# Patient Record
Sex: FEMALE | Race: WHITE | NOT HISPANIC OR LATINO | Employment: FULL TIME | URBAN - METROPOLITAN AREA
[De-identification: names, ages, dates, MRNs, and addresses within clinical notes are randomized per-mention and may not be internally consistent; named-entity substitution may affect disease eponyms.]

---

## 2018-12-04 ASSESSMENT — ANXIETY QUESTIONNAIRES
GAD7 TOTAL SCORE: 0
GAD7 TOTAL SCORE: 0
1. FEELING NERVOUS, ANXIOUS, OR ON EDGE: NOT AT ALL
6. BECOMING EASILY ANNOYED OR IRRITABLE: NOT AT ALL
7. FEELING AFRAID AS IF SOMETHING AWFUL MIGHT HAPPEN: NOT AT ALL
2. NOT BEING ABLE TO STOP OR CONTROL WORRYING: NOT AT ALL
4. TROUBLE RELAXING: NOT AT ALL
3. WORRYING TOO MUCH ABOUT DIFFERENT THINGS: NOT AT ALL
7. FEELING AFRAID AS IF SOMETHING AWFUL MIGHT HAPPEN: NOT AT ALL
5. BEING SO RESTLESS THAT IT IS HARD TO SIT STILL: NOT AT ALL

## 2018-12-05 ENCOUNTER — OFFICE VISIT (OUTPATIENT)
Dept: OBGYN | Facility: CLINIC | Age: 28
End: 2018-12-05
Attending: OBSTETRICS & GYNECOLOGY
Payer: COMMERCIAL

## 2018-12-05 VITALS
DIASTOLIC BLOOD PRESSURE: 72 MMHG | HEART RATE: 58 BPM | BODY MASS INDEX: 26.13 KG/M2 | WEIGHT: 147.5 LBS | SYSTOLIC BLOOD PRESSURE: 106 MMHG | HEIGHT: 63 IN

## 2018-12-05 DIAGNOSIS — Z12.4 SCREENING FOR MALIGNANT NEOPLASM OF CERVIX: Primary | ICD-10-CM

## 2018-12-05 PROCEDURE — G0463 HOSPITAL OUTPT CLINIC VISIT: HCPCS | Mod: ZF

## 2018-12-05 PROCEDURE — G0145 SCR C/V CYTO,THINLAYER,RESCR: HCPCS | Performed by: OBSTETRICS & GYNECOLOGY

## 2018-12-05 ASSESSMENT — PATIENT HEALTH QUESTIONNAIRE - PHQ9
5. POOR APPETITE OR OVEREATING: NOT AT ALL
SUM OF ALL RESPONSES TO PHQ QUESTIONS 1-9: 0

## 2018-12-05 ASSESSMENT — ANXIETY QUESTIONNAIRES
7. FEELING AFRAID AS IF SOMETHING AWFUL MIGHT HAPPEN: NOT AT ALL
1. FEELING NERVOUS, ANXIOUS, OR ON EDGE: NOT AT ALL
GAD7 TOTAL SCORE: 0
3. WORRYING TOO MUCH ABOUT DIFFERENT THINGS: NOT AT ALL
GAD7 TOTAL SCORE: 0
5. BEING SO RESTLESS THAT IT IS HARD TO SIT STILL: NOT AT ALL
6. BECOMING EASILY ANNOYED OR IRRITABLE: NOT AT ALL
2. NOT BEING ABLE TO STOP OR CONTROL WORRYING: NOT AT ALL

## 2018-12-05 NOTE — PROGRESS NOTES
CC/HPI:   Kayla Swanson is a 28 year old female  who presents today for her first annual exam. She is currently using abstinence  and would like to continue with this method of birth control until she gets  in June, 2019. Overall, patient reports doing well, little stressed with wedding plans. She says she's extremely nervous for her first pap smear. She has a friend with her.  Nervous for first IC as well.      Denies trauma or h/o abuse.     HISTORIES:  Patient Active Problem List   Diagnosis     Acne     Past Medical History:   Diagnosis Date     Uncomplicated asthma 2000    As a kid. Haven't required inhaler for years.     Past Surgical History:   Procedure Laterality Date     NO HISTORY OF SURGERY       Current Outpatient Prescriptions   Medication Sig Dispense Refill     Dietary Management Product (AXONA PO) Take by mouth daily       fluticasone (FLONASE) 50 MCG/ACT nasal spray Spray 2 sprays into both nostrils daily (Patient not taking: Reported on 12/5/2018) 1 Package 0     Tretinoin (RETIN-A EX) Externally apply topically as needed       No Known Allergies  Social History     Social History     Marital status: Single     Spouse name: N/A     Number of children: N/A     Years of education: N/A     Occupational History     Not on file.     Social History Main Topics     Smoking status: Never Smoker     Smokeless tobacco: Never Used     Alcohol use Yes      Comment: periodically drink wine for dinner.     Drug use: No     Sexual activity: No     Other Topics Concern     Not on file     Social History Narrative     Family History   Problem Relation Age of Onset     Diabetes Father      Type 2        Gyn Hx:   Patient's last menstrual period was 11/09/2018 (exact date).  Menses:   age of menarche:  13 years old  interval:  4 weeks  duration:  4 day(s)    Review Of Systems:  CONSTITUTIONAL: NEGATIVE for fever, chills  EYES: NEGATIVE for vision changes   RESP: NEGATIVE for significant cough or SOB  CV:  "NEGATIVE for chest pain, palpitations   GI: NEGATIVE for nausea, abdominal pain, heartburn, or change in bowel habits  : NEGATIVE for frequency, dysuria, or hematuria  MUSCULOSKELETAL: NEGATIVE for significant arthralgias or myalgia  NEURO: NEGATIVE for weakness, dizziness or paresthesias or headache    EXAM:  /72 (BP Location: Left arm, Patient Position: Chair)  Pulse 58  Ht 1.588 m (5' 2.5\")  Wt 66.9 kg (147 lb 8 oz)  LMP 11/09/2018 (Exact Date)  Breastfeeding? No  BMI 26.55 kg/m2  Body mass index is 26.55 kg/(m^2).    General - pleasant female in no acute distress, intermittently tearful during the exam.  Skin - no suspicious lesions or rashes  Lungs - clear to auscultation bilaterally.  Heart - regular rate and rhythm without murmur.  Breasts- symmetrical . No dominant fixed or suspicious masses noted.  No skin or nipple changes or axillary nodes.   Abdomen - soft, nontender, nondistended, no masses or organomegaly noted.  Musculoskeletal - no gross deformities.  Neurological - appropriate mental status.  Pelvic - EG: normal  female, vulva reveals no erythema or lesions.   BUS: within normal limits.  Vagina: well rugated, no lesions polyps or suspicious  discharge.     Cervix: no lesions, polyps discharge or CMT. Pap collected  Uterus: firm, anteverted, normal size and nontender.  Adnexa: no masses or tenderness.  Anus- normal, no lesions.  Rectovaginal - deferred.    Very tight pelvic floor muscles.       ASSESSMENT/PLAN  (Z12.4) Screening for malignant neoplasm of cervix  (primary encounter diagnosis)  Comment: Initial pap smear completed today, results pending.   Plan: recommended age 25 - 29 years screening if results are negative        Additional teaching done at this visit regarding exercise and birth control. I have discussed with patient risks and benefits of different forms of birth control.     Offered pelvic PT referral to see if biofeedback is possible prior to initiating IC.      The " patient will be notified of any results via [employer].      Tigist Andre, MS3 scribing for Dr. Mala Edgar MD     I was present with the medical student who participated in the service and in the documentation of the note. I have verified the history and personally performed the physical exam and medical decision making. I agree with the assessment and plan of care as documented in the note.    Mala Edgar MD        Answers for HPI/ROS submitted by the patient on 12/4/2018   CHIQUITA 7 TOTAL SCORE: 0  General Symptoms: No  Skin Symptoms: No  HENT Symptoms: No  EYE SYMPTOMS: No  HEART SYMPTOMS: No  LUNG SYMPTOMS: No  INTESTINAL SYMPTOMS: No  URINARY SYMPTOMS: No  GYNECOLOGIC SYMPTOMS: No  BREAST SYMPTOMS: No  SKELETAL SYMPTOMS: No  BLOOD SYMPTOMS: No  NERVOUS SYSTEM SYMPTOMS: No  MENTAL HEALTH SYMPTOMS: No

## 2018-12-05 NOTE — LETTER
12/5/2018       RE: Kayla Swanson  [address]  [facility] [address]     Dear Colleague,    Thank you for referring your patient, Kayla Swanson, to the [facility] at [facility]. Please see a copy of my visit note below.    CC/HPI:   Kayla Swanson is a 28 year old female  who presents today for her first annual exam. She is currently using abstinence  and would like to continue with this method of birth control until she gets  in June, 2019. Overall, patient reports doing well, little stressed with wedding plans. She says she's extremely nervous for her first pap smear. She has a friend with her.  Nervous for first IC as well.      Denies trauma or h/o abuse.     HISTORIES:  Patient Active Problem List   Diagnosis     Acne     Past Medical History:   Diagnosis Date     Uncomplicated asthma 2000    As a kid. Haven't required inhaler for years.     Past Surgical History:   Procedure Laterality Date     NO HISTORY OF SURGERY       Current Outpatient Prescriptions   Medication Sig Dispense Refill     Dietary Management Product (AXONA PO) Take by mouth daily       fluticasone (FLONASE) 50 MCG/ACT nasal spray Spray 2 sprays into both nostrils daily (Patient not taking: Reported on 12/5/2018) 1 Package 0     Tretinoin (RETIN-A EX) Externally apply topically as needed       No Known Allergies  Social History     Social History     Marital status: Single     Spouse name: N/A     Number of children: N/A     Years of education: N/A     Occupational History     Not on file.     Social History Main Topics     Smoking status: Never Smoker     Smokeless tobacco: Never Used     Alcohol use Yes      Comment: periodically drink wine for dinner.     Drug use: No     Sexual activity: No     Other Topics Concern     Not on file     Social History Narrative     Family History   Problem Relation Age of Onset     Diabetes Father      Type 2        Gyn Hx:  "  Patient's last menstrual period was 11/09/2018 (exact date).  Menses:   age of menarche:  13 years old  interval:  4 weeks  duration:  4 day(s)    Review Of Systems:  CONSTITUTIONAL: NEGATIVE for fever, chills  EYES: NEGATIVE for vision changes   RESP: NEGATIVE for significant cough or SOB  CV: NEGATIVE for chest pain, palpitations   GI: NEGATIVE for nausea, abdominal pain, heartburn, or change in bowel habits  : NEGATIVE for frequency, dysuria, or hematuria  MUSCULOSKELETAL: NEGATIVE for significant arthralgias or myalgia  NEURO: NEGATIVE for weakness, dizziness or paresthesias or headache    EXAM:  /72 (BP Location: Left arm, Patient Position: Chair)  Pulse 58  Ht 1.588 m (5' 2.5\")  Wt 66.9 kg (147 lb 8 oz)  LMP 11/09/2018 (Exact Date)  Breastfeeding? No  BMI 26.55 kg/m2  Body mass index is 26.55 kg/(m^2).    General - pleasant female in no acute distress, intermittently tearful during the exam.  Skin - no suspicious lesions or rashes  Lungs - clear to auscultation bilaterally.  Heart - regular rate and rhythm without murmur.  Breasts- symmetrical . No dominant fixed or suspicious masses noted.  No skin or nipple changes or axillary nodes.   Abdomen - soft, nontender, nondistended, no masses or organomegaly noted.  Musculoskeletal - no gross deformities.  Neurological - appropriate mental status.  Pelvic - EG: normal  female, vulva reveals no erythema or lesions.   BUS: within normal limits.  Vagina: well rugated, no lesions polyps or suspicious  discharge.     Cervix: no lesions, polyps discharge or CMT. Pap collected  Uterus: firm, anteverted, normal size and nontender.  Adnexa: no masses or tenderness.  Anus- normal, no lesions.  Rectovaginal - deferred.    Very tight pelvic floor muscles.       ASSESSMENT/PLAN  (Z12.4) Screening for malignant neoplasm of cervix  (primary encounter diagnosis)  Comment: Initial pap smear completed today, results pending.   Plan: recommended age 25 - 29 years " screening if results are negative        Additional teaching done at this visit regarding exercise and birth control. I have discussed with patient risks and benefits of different forms of birth control.     Offered pelvic PT referral to see if biofeedback is possible prior to initiating IC.      The patient will be notified of any results via [employer].      Tigist Andre, MS3 scribing for Dr. Mala Edgar MD     I was present with the medical student who participated in the service and in the documentation of the note. I have verified the history and personally performed the physical exam and medical decision making. I agree with the assessment and plan of care as documented in the note.    Mala Edgar MD        Answers for HPI/ROS submitted by the patient on 12/4/2018   CHIQUITA 7 TOTAL SCORE: 0  General Symptoms: No  Skin Symptoms: No  HENT Symptoms: No  EYE SYMPTOMS: No  HEART SYMPTOMS: No  LUNG SYMPTOMS: No  INTESTINAL SYMPTOMS: No  URINARY SYMPTOMS: No  GYNECOLOGIC SYMPTOMS: No  BREAST SYMPTOMS: No  SKELETAL SYMPTOMS: No  BLOOD SYMPTOMS: No  NERVOUS SYSTEM SYMPTOMS: No  MENTAL HEALTH SYMPTOMS: No      Again, thank you for allowing me to participate in the care of your patient.      Sincerely,    Mala Edgar MD

## 2018-12-05 NOTE — LETTER
Date:December 6, 2018      Patient was self referred, no letter generated. Do not send.        [facility] Information

## 2018-12-05 NOTE — MR AVS SNAPSHOT
"              After Visit Summary   12/5/2018    Kayla Swanson    MRN: [id number]           Patient Information     Date Of Birth          1990        Visit Information        Provider Department      12/5/2018 8:45 AM Mala Edgar MD [facility]        Today's Diagnoses     Screening for malignant neoplasm of cervix    -  1       Follow-ups after your visit        Who to contact     Please call your clinic at [phone] to:    Ask questions about your health    Make or cancel appointments    Discuss your medicines    Learn about your test results    Speak to your doctor            Additional Information About Your Visit        MyChart Information     [employer] gives you secure access to your electronic health record. If you see a primary care provider, you can also send messages to your care team and make appointments. If you have questions, please call your primary care clinic.  If you do not have a primary care provider, please call [phone] and they will assist you.      T[employer] is an electronic gateway that provides easy, online access to your medical records. With [employer], you can request a clinic appointment, read your test results, renew a prescription or communicate with your care team.     To access your existing account, please contact your [facility] or call [phone] for assistance.        Care EveryWhere ID     This is your Care EveryWhere ID. This could be used by other organizations to access your [facility] medical records  [id number]        Your Vitals Were     Pulse Height Last Period Breastfeeding? BMI (Body Mass Index)       58 1.588 m (5' 2.5\") 11/09/2018 (Exact Date) No 26.55 kg/m2        Blood Pressure from Last 3 Encounters:   12/05/18 106/72   04/03/14 112/78    Weight from Last 3 Encounters:   12/05/18 66.9 kg (147 lb 8 oz)   04/03/14 61.2 kg (135 lb)              We Performed the Following     Obtaining, " preparing and conveyance of cervical or vaginal smear to laboratory.     Pap imaged thin layer screen reflex to HPV if ASCUS - recommended age 25 - 29 years        Primary Care Provider    None Specified       No primary provider on file.        Equal Access to Services     SIGIFREDO OLGUIN : Lily Zaragoza, mikpankaj mckenzieaudeliaha, santos childress paulcalderonpankaj, marek kendrick haymanoj marrufojohnraphael lees. So [facility] [phone].    ATENCIÓN: Si habla español, tiene a sharma disposición servicios gratuitos de asistencia lingüística. Llame al [phone].    We comply with applicable federal civil rights laws and [place] laws. We do not discriminate on the basis of race, color, national origin, age, disability, sex, sexual orientation, or gender identity.            Thank you!     Thank you for choosing [facility]  for your care. Our goal is always to provide you with excellent care. Hearing back from our patients is one way we can continue to improve our services. Please take a few minutes to complete the written survey that you may receive in the mail after your visit with us. Thank you!             Your Updated Medication List - Protect others around you: Learn how to safely use, store and throw away your medicines at www.disposemymeds.org.          This list is accurate as of 12/5/18  2:11 PM.  Always use your most recent med list.                   Brand Name Dispense Instructions for use Diagnosis    AXONA PO      Take by mouth daily        fluticasone 50 MCG/ACT nasal spray    FLONASE    1 Package    Spray 2 sprays into both nostrils daily    Serous otitis media, bilateral       RETIN-A EX      Externally apply topically as needed

## 2018-12-10 LAB
COPATH REPORT: NORMAL
PAP: NORMAL

## 2018-12-14 ENCOUNTER — MYC MEDICAL ADVICE (OUTPATIENT)
Dept: OBGYN | Facility: CLINIC | Age: 28
End: 2018-12-14

## 2018-12-24 DIAGNOSIS — Z30.011 INITIATION OF ORAL CONTRACEPTION: Primary | ICD-10-CM

## 2018-12-24 RX ORDER — LEVONORGESTREL/ETHIN.ESTRADIOL 0.1-0.02MG
1 TABLET ORAL DAILY
Qty: 84 TABLET | Refills: 0 | Status: SHIPPED | OUTPATIENT
Start: 2018-12-24 | End: 2019-03-02

## 2019-01-02 NOTE — TELEPHONE ENCOUNTER
Looks like Dr. Castellon sent in the rx.    Mala Edgar MD, [facility]  [facility]  OB/GYN    1/2/2019  1:01 PM

## 2019-03-02 ENCOUNTER — MYC REFILL (OUTPATIENT)
Dept: OBGYN | Facility: CLINIC | Age: 29
End: 2019-03-02

## 2019-03-02 DIAGNOSIS — Z30.011 INITIATION OF ORAL CONTRACEPTION: ICD-10-CM

## 2019-03-08 RX ORDER — LEVONORGESTREL/ETHIN.ESTRADIOL 0.1-0.02MG
1 TABLET ORAL DAILY
Qty: 84 TABLET | Refills: 3 | Status: SHIPPED | OUTPATIENT
Start: 2019-03-08 | End: 2019-08-20

## 2019-03-08 NOTE — TELEPHONE ENCOUNTER
Received refill request for OCP.  Last in clinic 12/2018 with last pap done at that time and NIL. One year refill sent.

## 2019-08-20 ENCOUNTER — MYC REFILL (OUTPATIENT)
Dept: OBGYN | Facility: CLINIC | Age: 29
End: 2019-08-20

## 2019-08-20 DIAGNOSIS — Z30.011 INITIATION OF ORAL CONTRACEPTION: ICD-10-CM

## 2019-08-23 RX ORDER — LEVONORGESTREL/ETHIN.ESTRADIOL 0.1-0.02MG
1 TABLET ORAL DAILY
Qty: 84 TABLET | Refills: 3 | Status: SHIPPED | OUTPATIENT
Start: 2019-08-23 | End: 2020-07-22

## 2019-08-23 NOTE — TELEPHONE ENCOUNTER
Received refill request for OCP.  Last in clinic December 2018 with Pap NIL.  Able to send 1-year refill per protocol.

## 2019-11-16 ENCOUNTER — MYC REFILL (OUTPATIENT)
Dept: OBGYN | Facility: CLINIC | Age: 29
End: 2019-11-16

## 2019-11-16 DIAGNOSIS — Z30.011 INITIATION OF ORAL CONTRACEPTION: ICD-10-CM

## 2019-11-16 RX ORDER — LEVONORGESTREL/ETHIN.ESTRADIOL 0.1-0.02MG
1 TABLET ORAL DAILY
Qty: 84 TABLET | Refills: 3 | Status: CANCELLED | OUTPATIENT
Start: 2019-11-16

## 2020-03-01 ENCOUNTER — HEALTH MAINTENANCE LETTER (OUTPATIENT)
Age: 30
End: 2020-03-01

## 2020-07-22 ENCOUNTER — MYC REFILL (OUTPATIENT)
Dept: OBGYN | Facility: CLINIC | Age: 30
End: 2020-07-22

## 2020-07-22 DIAGNOSIS — Z30.011 INITIATION OF ORAL CONTRACEPTION: ICD-10-CM

## 2020-07-24 RX ORDER — LEVONORGESTREL/ETHIN.ESTRADIOL 0.1-0.02MG
1 TABLET ORAL DAILY
Qty: 84 TABLET | Refills: 3 | Status: SHIPPED | OUTPATIENT
Start: 2020-07-24 | End: 2020-10-30

## 2020-07-24 NOTE — TELEPHONE ENCOUNTER
OCP refill request. Pt last seen in clinic Dec 2018. Pap UTD. Will send refill and encourage pt to schedule annual

## 2020-08-24 ASSESSMENT — ANXIETY QUESTIONNAIRES
2. NOT BEING ABLE TO STOP OR CONTROL WORRYING: NOT AT ALL
1. FEELING NERVOUS, ANXIOUS, OR ON EDGE: NOT AT ALL
3. WORRYING TOO MUCH ABOUT DIFFERENT THINGS: NOT AT ALL
5. BEING SO RESTLESS THAT IT IS HARD TO SIT STILL: NOT AT ALL
GAD7 TOTAL SCORE: 0
4. TROUBLE RELAXING: NOT AT ALL
7. FEELING AFRAID AS IF SOMETHING AWFUL MIGHT HAPPEN: NOT AT ALL
6. BECOMING EASILY ANNOYED OR IRRITABLE: NOT AT ALL

## 2020-08-25 ENCOUNTER — OFFICE VISIT (OUTPATIENT)
Dept: OBGYN | Facility: CLINIC | Age: 30
End: 2020-08-25
Payer: COMMERCIAL

## 2020-08-25 DIAGNOSIS — Z01.419 ENCOUNTER FOR GYNECOLOGICAL EXAMINATION WITHOUT ABNORMAL FINDING: Primary | ICD-10-CM

## 2020-08-25 ASSESSMENT — ANXIETY QUESTIONNAIRES
7. FEELING AFRAID AS IF SOMETHING AWFUL MIGHT HAPPEN: NOT AT ALL
GAD7 TOTAL SCORE: 0
GAD7 TOTAL SCORE: 0

## 2020-08-26 ASSESSMENT — ANXIETY QUESTIONNAIRES: GAD7 TOTAL SCORE: 0

## 2020-08-26 NOTE — PROGRESS NOTES
Progress Note     SUBJECTIVE:  Kayla Pineda is a 29yo G0 who presents for annual exam. She was  in 2019. Reports that is going well. Denies dyspareunia. Is taking combined OCP and likes it. Patient wanted to discuss that having small, clear vaginal discharge throughout the month is normal. She denies itching, burning, or pain in the vaginal area. Denies vaginal bleeding outside of periods, dysuria, changes in BM, abdominal pain, chest pain, SOB, fever/chills.     Menstrual History:  Menstrual History 4/3/2014 12/5/2018 12/5/2018 8/25/2020   LAST MENSTRUAL PERIOD 3/1/2014 11/9/2018 - 8/11/2020   Menarche Age - - 13 -   Period Cycle (Days) - - 30 -   Period Duration (Days) - - 4 -   Method of Contraception - - None -   Period Pattern - - Regular -   Menstrual Flow - - Heavy -   Menstrual Control - - Maxi pad -   Dysmenorrhea - - Moderate -   PMS Symptoms - - Cramping -   Reviewed Today - - Yes -         Last          Lab Results   Component Value Date     PAP NIL 12/05/2018      History of abnormal Pap smear: none     HPV not indicated     HISTORY:               Prescription Medications as of 8/25/2020        Rx Number Disp Refills Start End Last Dispensed Date Next Fill Date Owning Pharmacy     levonorgestrel-ethinyl estradiol (AVIANE) 0.1-20 MG-MCG tablet   84 tablet 3 7/24/2020       [id number] - [place], [place] - [address]     Sig: Take 1 tablet by mouth daily     Class: E-Prescribe     Route: Oral          No Known Allergies     There is no immunization history on file for this patient.     OB History   No obstetric history on file.      Past Medical History        Past Medical History:   Diagnosis Date     Uncomplicated asthma 2000     As a kid. Haven't required inhaler for years.        Past Surgical History         Past Surgical History:   Procedure Laterality Date     NO HISTORY OF SURGERY            Family History         Family History   Problem Relation Age of Onset      "Diabetes Father           Type 2        Social History            Socioeconomic History     Marital status:        Spouse name: Not on file     Number of children: Not on file     Years of education: Not on file     Highest education level: Not on file   Occupational History     Not on file   Social Needs     Financial resource strain: Not on file     Food insecurity       Worry: Not on file       Inability: Not on file     Transportation needs       Medical: Not on file       Non-medical: Not on file   Tobacco Use     Smoking status: Never Smoker     Smokeless tobacco: Never Used   Substance and Sexual Activity     Alcohol use: Yes       Comment: periodically drink wine for dinner.     Drug use: No     Sexual activity: Never   Lifestyle     Physical activity       Days per week: Not on file       Minutes per session: Not on file     Stress: Not on file   Relationships     Social connections       Talks on phone: Not on file       Gets together: Not on file       Attends Sabianist service: Not on file       Active member of club or organization: Not on file       Attends meetings of clubs or organizations: Not on file       Relationship status: Not on file     Intimate partner violence       Fear of current or ex partner: Not on file       Emotionally abused: Not on file       Physically abused: Not on file       Forced sexual activity: Not on file   Other Topics Concern     Not on file   Social History Narrative     Not on file      Verbalized understanding and agreement with visit plan.  Answers for HPI/ROS submitted by the patient on 8/24/2020   CHIQUITA 7 TOTAL SCORE: 0     EXAM:  Blood pressure 123/87, pulse 68, resp. rate 16, height 1.6 m (5' 3\"), weight 71.9 kg (158 lb 9.6 oz), last menstrual period 08/11/2020, not currently breastfeeding. Body mass index is 28.09 kg/m .  General appearance: Pleasant female in no acute distress.   Cardio: RRR, no M/R/G  Pulm: CTAB  Abdomen: soft, non-tender, " non-distended  Extremities: no edema in BLE, well-perfused     BREAST EXAM:  Breast: Without visible skin changes. No dimpling or lesions seen.   Breasts supple, non-tender with palpation, no dominant mass, nodularity, or nipple discharge noted bilaterally. Fibrocystic change noted. Axillary nodes negative.       PELVIC EXAM:  EG/BUS: Normal genital architecture without lesions, erythema or abnormal secretions Bartholin's, Urethra, [place]'s normal   Urethral meatus: normal  Urethra: no masses, tenderness, or scarring  Bladder: no masses or tenderness  Vagina: moist, pink, rugae with physiologic discharge  secretions  Cervix: no lesions  Uterus: small, smooth, firm, mobile w/o pain  Adnexa: Within normal limits and No masses, nodularity, tenderness  Rectum:anus normal         ASSESSMENT:  30 year old  here for annual exam.     PLAN:   - Discussed that vaginal discharge is normal and if patient is having no symptoms there is nothing to do. Patient can call back if she has any burning/itching/pain.  - Continue OCPs  - Pap due next year.     Return in one year/PRN for preventive care or problems/concerns.     Patient staffed and seen with Dr. Pérez.     Sue López MD  Ob/Gyn Resident, PGY-1  20 4:38 PM     OBGYN Attending Addendum     Kayla Pineda was seen by the resident, Dr. López, in Continuity of Care Clinic. I, Mara Pérez, supervised all aspects of this visit, including the history and physical exam. The assessment and plan were made jointly between myself and Dr. López. I have edited the note where necessary to reflect my assessment and agree with the above documentation.     Mara Pérez MD, MSCI    [facility]/OBGYN

## 2020-10-30 ENCOUNTER — MYC REFILL (OUTPATIENT)
Dept: OBGYN | Facility: CLINIC | Age: 30
End: 2020-10-30

## 2020-10-30 DIAGNOSIS — Z30.011 INITIATION OF ORAL CONTRACEPTION: ICD-10-CM

## 2020-11-04 RX ORDER — LEVONORGESTREL/ETHIN.ESTRADIOL 0.1-0.02MG
1 TABLET ORAL DAILY
Qty: 84 TABLET | Refills: 2 | Status: SHIPPED | OUTPATIENT
Start: 2020-11-04 | End: 2021-08-31

## 2020-12-14 ENCOUNTER — HEALTH MAINTENANCE LETTER (OUTPATIENT)
Age: 30
End: 2020-12-14

## 2021-08-31 ENCOUNTER — OFFICE VISIT (OUTPATIENT)
Dept: OBGYN | Facility: CLINIC | Age: 31
End: 2021-08-31
Attending: NURSE PRACTITIONER
Payer: COMMERCIAL

## 2021-08-31 VITALS
DIASTOLIC BLOOD PRESSURE: 72 MMHG | SYSTOLIC BLOOD PRESSURE: 105 MMHG | HEIGHT: 63 IN | BODY MASS INDEX: 28.26 KG/M2 | HEART RATE: 65 BPM | WEIGHT: 159.5 LBS

## 2021-08-31 DIAGNOSIS — Z00.00 VISIT FOR PREVENTIVE HEALTH EXAMINATION: Primary | ICD-10-CM

## 2021-08-31 DIAGNOSIS — Z30.40 ENCOUNTER FOR SURVEILLANCE OF CONTRACEPTIVES, UNSPECIFIED CONTRACEPTIVE: ICD-10-CM

## 2021-08-31 DIAGNOSIS — Z12.4 SCREENING FOR CERVICAL CANCER: ICD-10-CM

## 2021-08-31 DIAGNOSIS — Z11.3 SCREEN FOR STD (SEXUALLY TRANSMITTED DISEASE): ICD-10-CM

## 2021-08-31 PROCEDURE — 87491 CHLMYD TRACH DNA AMP PROBE: CPT | Performed by: NURSE PRACTITIONER

## 2021-08-31 PROCEDURE — 87591 N.GONORRHOEAE DNA AMP PROB: CPT | Performed by: NURSE PRACTITIONER

## 2021-08-31 PROCEDURE — G0145 SCR C/V CYTO,THINLAYER,RESCR: HCPCS | Performed by: NURSE PRACTITIONER

## 2021-08-31 PROCEDURE — G0463 HOSPITAL OUTPT CLINIC VISIT: HCPCS

## 2021-08-31 PROCEDURE — 99395 PREV VISIT EST AGE 18-39: CPT | Performed by: NURSE PRACTITIONER

## 2021-08-31 RX ORDER — RIZATRIPTAN BENZOATE 10 MG/1
10 TABLET, ORALLY DISINTEGRATING ORAL
COMMUNITY
Start: 2021-02-20 | End: 2024-02-13

## 2021-08-31 RX ORDER — LEVONORGESTREL/ETHIN.ESTRADIOL 0.1-0.02MG
1 TABLET ORAL DAILY
Qty: 84 TABLET | Refills: 3 | Status: SHIPPED | OUTPATIENT
Start: 2021-08-31 | End: 2022-08-18

## 2021-08-31 ASSESSMENT — ANXIETY QUESTIONNAIRES
7. FEELING AFRAID AS IF SOMETHING AWFUL MIGHT HAPPEN: NOT AT ALL
GAD7 TOTAL SCORE: 1
1. FEELING NERVOUS, ANXIOUS, OR ON EDGE: NOT AT ALL
6. BECOMING EASILY ANNOYED OR IRRITABLE: SEVERAL DAYS
8. IF YOU CHECKED OFF ANY PROBLEMS, HOW DIFFICULT HAVE THESE MADE IT FOR YOU TO DO YOUR WORK, TAKE CARE OF THINGS AT HOME, OR GET ALONG WITH OTHER PEOPLE?: NOT DIFFICULT AT ALL
2. NOT BEING ABLE TO STOP OR CONTROL WORRYING: NOT AT ALL
1. FEELING NERVOUS, ANXIOUS, OR ON EDGE: NOT AT ALL
6. BECOMING EASILY ANNOYED OR IRRITABLE: NOT AT ALL
5. BEING SO RESTLESS THAT IT IS HARD TO SIT STILL: NOT AT ALL
7. FEELING AFRAID AS IF SOMETHING AWFUL MIGHT HAPPEN: NOT AT ALL
5. BEING SO RESTLESS THAT IT IS HARD TO SIT STILL: NOT AT ALL
GAD7 TOTAL SCORE: 1
7. FEELING AFRAID AS IF SOMETHING AWFUL MIGHT HAPPEN: NOT AT ALL
4. TROUBLE RELAXING: NOT AT ALL
2. NOT BEING ABLE TO STOP OR CONTROL WORRYING: NOT AT ALL
3. WORRYING TOO MUCH ABOUT DIFFERENT THINGS: NOT AT ALL
GAD7 TOTAL SCORE: 1
3. WORRYING TOO MUCH ABOUT DIFFERENT THINGS: NOT AT ALL
GAD7 TOTAL SCORE: 0

## 2021-08-31 ASSESSMENT — PATIENT HEALTH QUESTIONNAIRE - PHQ9: 5. POOR APPETITE OR OVEREATING: NOT AT ALL

## 2021-08-31 ASSESSMENT — MIFFLIN-ST. JEOR: SCORE: 1417.62

## 2021-08-31 NOTE — LETTER
Date:January 6, 2022      Patient was self referred, no letter generated. Do not send.        [facility] Information

## 2021-08-31 NOTE — LETTER
2021       RE: Kayla Pineda  [address]  [place] [address]     Dear Colleague,    Thank you for referring your patient, Kayla Pineda, to the [facility] [place] at [facility]. Please see a copy of my visit note below.      Progress Note    SUBJECTIVE:  Kayla Pineda is an 31 year old, , who requests an Annual Preventive Exam.     Concerns today include:     1. Refill on COCs: pleased with the birth control pill she is on; no trouble remembering to take the pills. Has migraines but denies aura symptoms.     Last pap smear 2018 NIL --> desires to complete today  - no hx of abnormal pap smears    Sexual hx: male partner, 1 in the last year  - Contraception: COCs  - Denies sexual concerns  - Desires STD testing    Lipids: never tested; no fam hx of hyperlipidemia     Exercise: occasional  Diet: no specific diet; regularly eats calcium rich foods     Mental Health: feeling well; no concerns    Menstrual History:  Menstrual History 2020   LAST MENSTRUAL PERIOD 2020 -   Menarche Age - - -   Period Cycle (Days) - - 30   Period Duration (Days) - - 2-3   Method of Contraception - - Combined oral contraceptive   Period Pattern - - Regular   Menstrual Flow - - Moderate   Menstrual Control - - Maxi pad;Tampon   Dysmenorrhea - - Mild   PMS Symptoms - - Cramping   Reviewed Today - - Yes     Mammogram current: n/a - no fam hx of breast cancer     Last Colonoscopy: n/a - no  fam hx of colon cancer    HISTORY:  levonorgestrel-ethinyl estradiol (AVIANE) 0.1-20 MG-MCG tablet, Take 1 tablet by mouth daily  rizatriptan (MAXALT-MLT) 10 MG ODT, Take 10 mg by mouth    No current facility-administered medications on file prior to visit.  Migraines well managed, with Vitamin B, Magnesium, and Rizatriptan. Managed by PCP.    No Known Allergies  Immunization History   Administered Date(s)  "Administered     COVID-19,PF,Pfizer 2021, 2021   Tdap 2021    OB History    Para Term  AB Living   0 0 0 0 0 0   SAB TAB Ectopic Multiple Live Births   0 0 0 0 0     Past Medical History:   Diagnosis Date     Migraine2020     Uncomplicated asthma     As a kid. Haven't required inhaler for years.     Past Surgical History:   Procedure Laterality Date     ENT SURGERY  2009    [place] teeth     NO HISTORY OF SURGERY       Family History   Problem Relation Age of Onset     Diabetes Father         Type 2     Breast Cancer No family hx of      Colon Cancer No family hx of      Hyperlipidemia No family hx of      Social History     Socioeconomic History     Marital status:      Spouse name: None     Number of children: None     Years of education: None     Highest education level: None   Occupational History     None   Tobacco Use     Smoking status: Never Smoker     Smokeless tobacco: Never Used   Substance and Sexual Activity     Alcohol use: Yes     Comment: periodically drink wine for dinner.     Drug use: No     Sexual activity: Yes     Partners: Male     Birth control/protection: Pill   Other Topics Concern     None   Social History Narrative     None     ROS  ROS: 10 point ROS neg other than the symptoms noted above in the HPI.    PHQ-9 SCORE 2018   PHQ-9 Total Score 0   PHQ-9 today: 0    CHIQUITA-7 SCORE 2020   Total Score 0 (minimal anxiety) - 1 (minimal anxiety)   Total Score 0 1 0     EXAM:  Blood pressure 105/72, pulse 65, height 1.6 m (5' 3\"), weight 72.3 kg (159 lb 8 oz), last menstrual period 2021, not currently breastfeeding. Body mass index is 28.25 kg/m .  General - pleasant female in no acute distress.  Skin - no suspicious lesions or rashes  EENT-  euthyroid with out palpable nodules  Neck - supple without lymphadenopathy.  Lungs - clear to auscultation bilaterally.  Heart - regular rate and rhythm without murmur.  Abdomen - soft, " nontender, nondistended, no masses or organomegaly noted.  Musculoskeletal - no gross deformities.  Neurological - normal strength, sensation, and mental status.    Breast Exam:  Breast: Without visible skin changes. No dimpling or lesions seen.   Breasts supple, non-tender with palpation, no dominant mass, nodularity, or nipple discharge noted bilaterally. Axillary nodes negative.      Pelvic Exam:  EG/BUS: Normal genital architecture without lesions, erythema or abnormal secretions; Bartholin's, Urethra, Middleberg's normal   Urethral meatus: normal   Urethra: no masses, tenderness, or scarring   Bladder: no masses or tenderness   Vagina: moist, pink, rugae with creamy, white and odorless secretions  Cervix: Nulliparous, and pink, moist, closed, without lesion  Rectum: anus normal     ASSESSMENT:  Encounter Diagnoses   Name Primary?     Visit for preventive health examination Yes     Encounter for surveillance of contraceptives, unspecified contraceptive      Screen for STD (sexually transmitted disease)      Screening for cervical cancer         PLAN:   STD testing completed today  Pap smear done today  Offered lipid and glucose screening; pt declined and would prefer to reconsider next year.   Immunizations up to date   Refill provided for COCs.   Additional teaching done at this visit regarding calcium (1200 mg per day), exercise, birth control, mental health and weight/diet.    Return to clinic in one year.  Follow-up as needed.    Marry Briceno, CIARAN, APRN, WHNP          Again, thank you for allowing me to participate in the care of your patient.      Sincerely,    Marry Briceno, VIDA CNP

## 2021-08-31 NOTE — PROGRESS NOTES
Progress Note    SUBJECTIVE:  Kayla Pineda is an 31 year old, , who requests an Annual Preventive Exam.     Concerns today include:     1. Refill on COCs: pleased with the birth control pill she is on; no trouble remembering to take the pills. Has migraines but denies aura symptoms.     Last pap smear 2018 NIL --> desires to complete today  - no hx of abnormal pap smears    Sexual hx: male partner, 1 in the last year  - Contraception: COCs  - Denies sexual concerns  - Desires STD testing    Lipids: never tested; no fam hx of hyperlipidemia     Exercise: occasional  Diet: no specific diet; regularly eats calcium rich foods     Mental Health: feeling well; no concerns    Menstrual History:  Menstrual History 2020   LAST MENSTRUAL PERIOD 2020 -   Menarche Age - - -   Period Cycle (Days) - - 30   Period Duration (Days) - - 2-3   Method of Contraception - - Combined oral contraceptive   Period Pattern - - Regular   Menstrual Flow - - Moderate   Menstrual Control - - Maxi pad;Tampon   Dysmenorrhea - - Mild   PMS Symptoms - - Cramping   Reviewed Today - - Yes     Mammogram current: n/a - no fam hx of breast cancer     Last Colonoscopy: n/a - no  fam hx of colon cancer    HISTORY:  levonorgestrel-ethinyl estradiol (AVIANE) 0.1-20 MG-MCG tablet, Take 1 tablet by mouth daily  rizatriptan (MAXALT-MLT) 10 MG ODT, Take 10 mg by mouth    No current facility-administered medications on file prior to visit.  Migraines well managed, with Vitamin B, Magnesium, and Rizatriptan. Managed by PCP.    No Known Allergies  Immunization History   Administered Date(s) Administered     COVID-19,PF,Pfizer 2021, 2021   Tdap 2021    OB History    Para Term  AB Living   0 0 0 0 0 0   SAB TAB Ectopic Multiple Live Births   0 0 0 0 0     Past Medical History:   Diagnosis Date     Migraines      Uncomplicated asthma     As a kid. Haven't required inhaler for  "years.     Past Surgical History:   Procedure Laterality Date     ENT SURGERY  2009    [place] teeth     NO HISTORY OF SURGERY       Family History   Problem Relation Age of Onset     Diabetes Father         Type 2     Breast Cancer No family hx of      Colon Cancer No family hx of      Hyperlipidemia No family hx of      Social History     Socioeconomic History     Marital status:      Spouse name: None     Number of children: None     Years of education: None     Highest education level: None   Occupational History     None   Tobacco Use     Smoking status: Never Smoker     Smokeless tobacco: Never Used   Substance and Sexual Activity     Alcohol use: Yes     Comment: periodically drink wine for dinner.     Drug use: No     Sexual activity: Yes     Partners: Male     Birth control/protection: Pill   Other Topics Concern     None   Social History Narrative     None     ROS  ROS: 10 point ROS neg other than the symptoms noted above in the HPI.    PHQ-9 SCORE 12/5/2018   PHQ-9 Total Score 0   PHQ-9 today: 0    CHIQUITA-7 SCORE 8/24/2020 8/31/2021 8/31/2021   Total Score 0 (minimal anxiety) - 1 (minimal anxiety)   Total Score 0 1 0     EXAM:  Blood pressure 105/72, pulse 65, height 1.6 m (5' 3\"), weight 72.3 kg (159 lb 8 oz), last menstrual period 08/24/2021, not currently breastfeeding. Body mass index is 28.25 kg/m .  General - pleasant female in no acute distress.  Skin - no suspicious lesions or rashes  EENT-  euthyroid with out palpable nodules  Neck - supple without lymphadenopathy.  Lungs - clear to auscultation bilaterally.  Heart - regular rate and rhythm without murmur.  Abdomen - soft, nontender, nondistended, no masses or organomegaly noted.  Musculoskeletal - no gross deformities.  Neurological - normal strength, sensation, and mental status.    Breast Exam:  Breast: Without visible skin changes. No dimpling or lesions seen.   Breasts supple, non-tender with palpation, no dominant mass, nodularity, or " nipple discharge noted bilaterally. Axillary nodes negative.      Pelvic Exam:  EG/BUS: Normal genital architecture without lesions, erythema or abnormal secretions; Bartholin's, Urethra, [place]'s normal   Urethral meatus: normal   Urethra: no masses, tenderness, or scarring   Bladder: no masses or tenderness   Vagina: moist, pink, rugae with creamy, white and odorless secretions  Cervix: Nulliparous, and pink, moist, closed, without lesion  Rectum: anus normal     ASSESSMENT:  Encounter Diagnoses   Name Primary?     Visit for preventive health examination Yes     Encounter for surveillance of contraceptives, unspecified contraceptive      Screen for STD (sexually transmitted disease)      Screening for cervical cancer         PLAN:   STD testing completed today  Pap smear done today  Offered lipid and glucose screening; pt declined and would prefer to reconsider next year.   Immunizations up to date   Refill provided for COCs.   Additional teaching done at this visit regarding calcium (1200 mg per day), exercise, birth control, mental health and weight/diet.    Return to clinic in one year.  Follow-up as needed.    Marry rBiceno, DNP, APRN, WHNP

## 2021-09-01 LAB
C TRACH DNA SPEC QL NAA+PROBE: NEGATIVE
N GONORRHOEA DNA SPEC QL NAA+PROBE: NEGATIVE

## 2021-09-03 LAB
BKR LAB AP GYN ADEQUACY: NORMAL
BKR LAB AP GYN INTERPRETATION: NORMAL
BKR LAB AP HPV REFLEX: NORMAL
BKR LAB AP LMP: NORMAL
BKR LAB AP PREVIOUS ABNORMAL: NORMAL
PATH REPORT.COMMENTS IMP SPEC: NORMAL
PATH REPORT.RELEVANT HX SPEC: NORMAL

## 2021-10-02 ENCOUNTER — HEALTH MAINTENANCE LETTER (OUTPATIENT)
Age: 31
End: 2021-10-02

## 2022-08-18 ENCOUNTER — MYC MEDICAL ADVICE (OUTPATIENT)
Dept: OBGYN | Facility: CLINIC | Age: 32
End: 2022-08-18

## 2022-08-18 DIAGNOSIS — Z00.00 VISIT FOR PREVENTIVE HEALTH EXAMINATION: Primary | ICD-10-CM

## 2022-08-18 RX ORDER — LEVONORGESTREL/ETHIN.ESTRADIOL 0.1-0.02MG
1 TABLET ORAL DAILY
Qty: 84 TABLET | Refills: 0 | Status: SHIPPED | OUTPATIENT
Start: 2022-08-18 | End: 2022-11-27

## 2022-08-18 NOTE — TELEPHONE ENCOUNTER
Refill request for OCP, last visit 8/2021. Due for annual, sent short supply per protocol and reminder message to schedule.

## 2022-09-07 DIAGNOSIS — Z00.00 VISIT FOR PREVENTIVE HEALTH EXAMINATION: ICD-10-CM

## 2022-09-07 RX ORDER — LEVONORGESTREL/ETHIN.ESTRADIOL 0.1-0.02MG
1 TABLET ORAL DAILY
Qty: 84 TABLET | Refills: 3 | Status: CANCELLED | OUTPATIENT
Start: 2022-09-07

## 2023-01-14 ENCOUNTER — HEALTH MAINTENANCE LETTER (OUTPATIENT)
Age: 33
End: 2023-01-14

## 2023-01-14 ASSESSMENT — ANXIETY QUESTIONNAIRES
2. NOT BEING ABLE TO STOP OR CONTROL WORRYING: NOT AT ALL
7. FEELING AFRAID AS IF SOMETHING AWFUL MIGHT HAPPEN: NOT AT ALL
IF YOU CHECKED OFF ANY PROBLEMS ON THIS QUESTIONNAIRE, HOW DIFFICULT HAVE THESE PROBLEMS MADE IT FOR YOU TO DO YOUR WORK, TAKE CARE OF THINGS AT HOME, OR GET ALONG WITH OTHER PEOPLE: NOT DIFFICULT AT ALL
5. BEING SO RESTLESS THAT IT IS HARD TO SIT STILL: NOT AT ALL
8. IF YOU CHECKED OFF ANY PROBLEMS, HOW DIFFICULT HAVE THESE MADE IT FOR YOU TO DO YOUR WORK, TAKE CARE OF THINGS AT HOME, OR GET ALONG WITH OTHER PEOPLE?: NOT DIFFICULT AT ALL
GAD7 TOTAL SCORE: 0
3. WORRYING TOO MUCH ABOUT DIFFERENT THINGS: NOT AT ALL
1. FEELING NERVOUS, ANXIOUS, OR ON EDGE: NOT AT ALL
4. TROUBLE RELAXING: NOT AT ALL
GAD7 TOTAL SCORE: 0
6. BECOMING EASILY ANNOYED OR IRRITABLE: NOT AT ALL
7. FEELING AFRAID AS IF SOMETHING AWFUL MIGHT HAPPEN: NOT AT ALL

## 2023-01-16 NOTE — PROGRESS NOTES
Progress Note    SUBJECTIVE:  Kayla Pineda is an 32 year old, , who requests an Annual Preventive Exam.     Concerns today include:     1. Refill on COCs: pleased with the birth control pill she is on; no trouble remembering to take the pills. Has migraines but denies aura symptoms. Bleeding is light during placebo week. Denies intermenstrual bleeding.     2. Migraines without aura: Had been prescribed Maxalt, but didn't find this helpful so hasn't been taking it. Migraines occur once every other week.  Has been using Advil and cold head compress.  Provides some relief but not complete.  Unable to identify triggers. Doesn't seem correlated to alcohol. Is able to work with migraines, but does cause her to cancel other plans. Hasn't tried any other medications for migraines.  Has always had migraine headaches, but they have been more frequent the last 5 yrs.  Does experience nausea with migraines.  Occasional mild light sensitivity. Denies aura symptoms.              Last pap smear 2021 NIL --> next due 2024  - no hx of abnormal pap smears     Sexual hx: male partner, 1 in the last year  - Contraception: COCs  - Denies sexual concerns  - Declines STD testing     Lipids: never tested; no fam hx of hyperlipidemia  - declines screening today      Exercise: daily walks with her dog  Diet: no specific diet; regularly eats calcium rich foods; tries to have fruits and vegetables everyday.      Mental Health: feeling well; no concerns    Menstrual History:  Menstrual History 2021   LAST MENSTRUAL PERIOD 2021 - 2022   Menarche Age - - -   Period Cycle (Days) - 30 -   Period Duration (Days) - 2-3 -   Method of Contraception - Combined oral contraceptive -   Period Pattern - Regular -   Menstrual Flow - Moderate -   Menstrual Control - Maxi pad;Tampon -   Dysmenorrhea - Mild -   PMS Symptoms - Cramping -   Reviewed Today - Yes -     Mammogram current: n/a  - no fam hx  Last  Colonoscopy: n/a - no fam hx      HISTORY:  rizatriptan (MAXALT-MLT) 10 MG ODT, Take 10 mg by mouth (Patient not taking: Reported on 2023)    No current facility-administered medications on file prior to visit.    No Known Allergies  There is no immunization history for the selected administration types on file for this patient.    OB History    Para Term  AB Living   0 0 0 0 0 0   SAB IAB Ectopic Multiple Live Births   0 0 0 0 0     Past Medical History:   Diagnosis Date     Migraines      Uncomplicated asthma     As a kid. Haven't required inhaler for years.     Past Surgical History:   Procedure Laterality Date     ENT SURGERY      [place] teeth     NO HISTORY OF SURGERY       Family History   Problem Relation Age of Onset     Diabetes Father         Type 2     Breast Cancer No family hx of      Colon Cancer No family hx of      Hyperlipidemia No family hx of      Social History     Socioeconomic History     Marital status:      Spouse name: None     Number of children: None     Years of education: None     Highest education level: None   Tobacco Use     Smoking status: Never     Smokeless tobacco: Never   Substance and Sexual Activity     Alcohol use: Yes     Comment: periodically drink wine for dinner.     Drug use: No     Sexual activity: Yes     Partners: Male     Birth control/protection: Pill       Review of Systems     Constitutional:  Negative for fever, chills, weight loss, weight gain, fatigue, decreased appetite, night sweats, recent stressors, height gain, height loss, post-operative complications, incisional pain, hallucinations, increased energy, hyperactivity and confused.   HENT:  Negative for ear pain, hearing loss, tinnitus, nosebleeds, trouble swallowing, hoarse voice, mouth sores, sore throat, ear discharge, tooth pain, gum tenderness, taste disturbance, smell disturbance, hearing aid, bleeding gums, dry mouth, sinus pain, sinus congestion and neck mass.     Eyes:  Negative for double vision, pain, redness, eye pain, decreased vision, eye watering, eye bulging, eye dryness, flashing lights, spots, floaters, strabismus, tunnel vision, jaundice and eye irritation.   Respiratory:   Negative for cough, hemoptysis, sputum production, shortness of breath, wheezing, sleep disturbances due to breathing, snores loudly, respiratory pain, dyspnea on exertion, cough disturbing sleep and postural dyspnea.    Cardiovascular:  Negative for chest pain, dyspnea on exertion, palpitations, orthopnea, claudication, leg swelling, fingers/toes turn blue, hypertension, hypotension, syncope, history of heart murmur, chest pain on exertion, chest pain at rest, pacemaker, few scattered varicosities, leg pain, sleep disturbances due to breathing, tachycardia, light-headedness, exercise intolerance and edema.   Gastrointestinal:  Negative for heartburn, nausea, vomiting, abdominal pain, diarrhea, constipation, blood in stool, melena, rectal pain, bloating, hemorrhoids, bowel incontinence, jaundice, rectal bleeding, coffee ground emesis and change in stool.   Genitourinary:  Negative for bladder incontinence, dysuria, urgency, hematuria, flank pain, vaginal discharge, difficulty urinating, genital sores, dyspareunia, decreased libido, nocturia, voiding less frequently, arousal difficulty, abnormal vaginal bleeding, excessive menstruation, menstrual changes, hot flashes, vaginal dryness and postmenopausal bleeding.   Musculoskeletal:  Negative for myalgias, back pain, joint swelling, arthralgias, stiffness, muscle cramps, neck pain, bone pain, muscle weakness and fracture.   Skin:  Negative for nail changes, itching, poor wound healing, rash, hair changes, skin changes, acne, warts, poor wound healing, scarring, flaky skin, Raynaud's phenomenon, sensitivity to sunlight and skin thickening.   Neurological:  Negative for dizziness, tingling, tremors, speech change, seizures, loss of consciousness,  "weakness, light-headedness, numbness, headaches, disturbances in coordination, extremity numbness, memory loss, difficulty walking and paralysis.   Endo/Heme:  Negative for anemia, swollen glands and bruises/bleeds easily.   Psychiatric/Behavioral:  Negative for depression, hallucinations, memory loss, decreased concentration, mood swings and panic attacks.    Breast:  Negative for breast discharge, breast mass, breast pain and nipple retraction.   Endocrine:  Negative for altered temperature regulation, polyphagia, polydipsia, unwanted hair growth and change in facial hair.      PHQ-9 SCORE 12/5/2018 1/17/2023   PHQ-9 Total Score 0 0     CHIQUITA-7 SCORE 8/31/2021 1/14/2023 1/17/2023   Total Score 1 (minimal anxiety) 0 (minimal anxiety) -   Total Score 0 0 0     EXAM:  Blood pressure 108/77, pulse 73, height 1.575 m (5' 2\"), weight 74.8 kg (165 lb), last menstrual period 12/17/2022, not currently breastfeeding. Body mass index is 30.18 kg/m .  General - pleasant female in no acute distress.  Skin - no suspicious lesions or rashes  EENT-  euthyroid with out palpable nodules  Neck - supple without lymphadenopathy.  Lungs - clear to auscultation bilaterally.  Heart - regular rate and rhythm without murmur.  Abdomen - soft, nontender, nondistended, no masses or organomegaly noted.  Musculoskeletal - no gross deformities.  Neurological - normal strength, sensation, and mental status.    Breast Exam:  Breast: Without visible skin changes. No dimpling or lesions seen.   Breasts supple, non-tender with palpation, no dominant mass, nodularity, or nipple discharge noted bilaterally. Axillary nodes negative.      Pelvic Exam: deferred      ASSESSMENT:  Encounter Diagnoses   Name Primary?     Visit for preventive health examination Yes     Encounter for surveillance of contraceptive pills      Migraine without aura and without status migrainosus, not intractable         PLAN:     Orders Placed This Encounter   Medications     " levonorgestrel-ethinyl estradiol (AVIANE) 0.1-20 MG-MCG tablet     Sig: Take 1 tablet by mouth daily     Dispense:  84 tablet     Refill:  4     SUMAtriptan (IMITREX) 50 MG tablet     Sig: Take 1 tablet (50 mg) by mouth at onset of headache for migraine May repeat in 2 hours. Max 4 tablets/24 hours.     Dispense:  8 tablet     Refill:  0     Declines flu vaccine and COVID booster. Tdap is up to date  Declined lipid profile today  Pap smear next due 8/2024.   Migraines to be managed with Imitrex Rx, see above. Pt to follow-up in 3 months or sooner PRN if inadequate relief from this therapy.   Refill provided for COCs.   Additional teaching done at this visit regarding calcium (1200 mg per day), self breast exam, exercise, birth control, mental health and weight/diet.    Return to clinic in one year.  Follow-up as needed.    Marry Briceno, DNP, APRN, WHNP

## 2023-01-17 ENCOUNTER — OFFICE VISIT (OUTPATIENT)
Dept: OBGYN | Facility: CLINIC | Age: 33
End: 2023-01-17
Attending: NURSE PRACTITIONER
Payer: COMMERCIAL

## 2023-01-17 VITALS
DIASTOLIC BLOOD PRESSURE: 77 MMHG | SYSTOLIC BLOOD PRESSURE: 108 MMHG | HEART RATE: 73 BPM | WEIGHT: 165 LBS | HEIGHT: 62 IN | BODY MASS INDEX: 30.36 KG/M2

## 2023-01-17 DIAGNOSIS — G43.009 MIGRAINE WITHOUT AURA AND WITHOUT STATUS MIGRAINOSUS, NOT INTRACTABLE: ICD-10-CM

## 2023-01-17 DIAGNOSIS — Z00.00 VISIT FOR PREVENTIVE HEALTH EXAMINATION: Primary | ICD-10-CM

## 2023-01-17 DIAGNOSIS — Z30.41 ENCOUNTER FOR SURVEILLANCE OF CONTRACEPTIVE PILLS: ICD-10-CM

## 2023-01-17 PROCEDURE — G0463 HOSPITAL OUTPT CLINIC VISIT: HCPCS | Performed by: NURSE PRACTITIONER

## 2023-01-17 PROCEDURE — 99395 PREV VISIT EST AGE 18-39: CPT | Performed by: NURSE PRACTITIONER

## 2023-01-17 RX ORDER — LEVONORGESTREL/ETHIN.ESTRADIOL 0.1-0.02MG
1 TABLET ORAL DAILY
Qty: 84 TABLET | Refills: 4 | Status: SHIPPED | OUTPATIENT
Start: 2023-01-17 | End: 2024-02-13

## 2023-01-17 RX ORDER — SUMATRIPTAN 50 MG/1
50 TABLET, FILM COATED ORAL
Qty: 8 TABLET | Refills: 0 | Status: SHIPPED | OUTPATIENT
Start: 2023-01-17 | End: 2023-07-23

## 2023-01-17 ASSESSMENT — ENCOUNTER SYMPTOMS
STIFFNESS: 0
BREAST MASS: 0
NECK MASS: 0
RESPIRATORY PAIN: 0
POSTURAL DYSPNEA: 0
HYPOTENSION: 0
HEARTBURN: 0
SNORES LOUDLY: 0
EYE IRRITATION: 0
HEMOPTYSIS: 0
CONSTIPATION: 0
TREMORS: 0
HOT FLASHES: 0
FATIGUE: 0
POLYDIPSIA: 0
NERVOUS/ANXIOUS: 0
SHORTNESS OF BREATH: 0
MYALGIAS: 0
TACHYCARDIA: 0
DECREASED LIBIDO: 0
NAUSEA: 0
VOMITING: 0
RECTAL BLEEDING: 0
JAUNDICE: 0
CHILLS: 0
HEADACHES: 0
ORTHOPNEA: 0
DECREASED CONCENTRATION: 0
DECREASED APPETITE: 0
SINUS CONGESTION: 0
BRUISES/BLEEDS EASILY: 0
NIGHT SWEATS: 0
LEG SWELLING: 0
EXERCISE INTOLERANCE: 0
BLOOD IN STOOL: 0
ARTHRALGIAS: 0
INSOMNIA: 0
FEVER: 0
FLANK PAIN: 0
HOARSE VOICE: 0
TROUBLE SWALLOWING: 0
NAIL CHANGES: 0
NUMBNESS: 0
PANIC: 0
DIARRHEA: 0
DYSPNEA ON EXERTION: 0
DIZZINESS: 0
ABDOMINAL PAIN: 0
DEPRESSION: 0
POLYPHAGIA: 0
COUGH DISTURBING SLEEP: 0
SWOLLEN GLANDS: 0
PARALYSIS: 0
WEAKNESS: 0
SYNCOPE: 0
INCREASED ENERGY: 0
EXTREMITY NUMBNESS: 0
POOR WOUND HEALING: 0
PALPITATIONS: 0
LOSS OF CONSCIOUSNESS: 0
SEIZURES: 0
DISTURBANCES IN COORDINATION: 0
SKIN CHANGES: 0
LEG PAIN: 0
CLAUDICATION: 0
HEMATURIA: 0
TINGLING: 0
BREAST PAIN: 0
HYPERTENSION: 0
SPEECH CHANGE: 0
WEIGHT LOSS: 0
RECTAL PAIN: 0
LIGHT-HEADEDNESS: 0
EYE REDNESS: 0
ALTERED TEMPERATURE REGULATION: 0
COUGH: 0
BACK PAIN: 0
BLOATING: 0
HALLUCINATIONS: 0
MUSCLE WEAKNESS: 0
SLEEP DISTURBANCES DUE TO BREATHING: 0
SMELL DISTURBANCE: 0
MUSCLE CRAMPS: 0
MEMORY LOSS: 0
EYE PAIN: 0
WHEEZING: 0
DYSURIA: 0
DOUBLE VISION: 0
SINUS PAIN: 0
DIFFICULTY URINATING: 0
WEIGHT GAIN: 0
NECK PAIN: 0
BOWEL INCONTINENCE: 0
TASTE DISTURBANCE: 0
SORE THROAT: 0
EYE WATERING: 0
JOINT SWELLING: 0
SPUTUM PRODUCTION: 0

## 2023-01-17 ASSESSMENT — PATIENT HEALTH QUESTIONNAIRE - PHQ9
5. POOR APPETITE OR OVEREATING: NOT AT ALL
SUM OF ALL RESPONSES TO PHQ QUESTIONS 1-9: 0

## 2023-01-17 ASSESSMENT — ANXIETY QUESTIONNAIRES
7. FEELING AFRAID AS IF SOMETHING AWFUL MIGHT HAPPEN: NOT AT ALL
5. BEING SO RESTLESS THAT IT IS HARD TO SIT STILL: NOT AT ALL
3. WORRYING TOO MUCH ABOUT DIFFERENT THINGS: NOT AT ALL
6. BECOMING EASILY ANNOYED OR IRRITABLE: NOT AT ALL
1. FEELING NERVOUS, ANXIOUS, OR ON EDGE: NOT AT ALL
GAD7 TOTAL SCORE: 0
GAD7 TOTAL SCORE: 0
2. NOT BEING ABLE TO STOP OR CONTROL WORRYING: NOT AT ALL

## 2023-01-17 NOTE — LETTER
2023       RE: Kayla Pineda  [address]  [place] [address]     Dear Colleague,    Thank you for referring your patient, Kayla Pineda, to the [facility] [place] at [facility]. Please see a copy of my visit note below.      Progress Note    SUBJECTIVE:  Kayla Pineda is an 32 year old, , who requests an Annual Preventive Exam.     Concerns today include:     1. Refill on COCs: pleased with the birth control pill she is on; no trouble remembering to take the pills. Has migraines but denies aura symptoms. Bleeding is light during placebo week. Denies intermenstrual bleeding.     2. Migraines without aura: Had been prescribed Maxalt, but didn't find this helpful so hasn't been taking it. Migraines occur once every other week.  Has been using Advil and cold head compress.  Provides some relief but not complete.  Unable to identify triggers. Doesn't seem correlated to alcohol. Is able to work with migraines, but does cause her to cancel other plans. Hasn't tried any other medications for migraines.  Has always had migraine headaches, but they have been more frequent the last 5 yrs.  Does experience nausea with migraines.  Occasional mild light sensitivity. Denies aura symptoms.              Last pap smear 2021 NIL --> next due 2024  - no hx of abnormal pap smears     Sexual hx: male partner, 1 in the last year  - Contraception: COCs  - Denies sexual concerns  - Declines STD testing     Lipids: never tested; no fam hx of hyperlipidemia  - declines screening today      Exercise: daily walks with her dog  Diet: no specific diet; regularly eats calcium rich foods; tries to have fruits and vegetables everyday.      Mental Health: feeling well; no concerns    Menstrual History:  Menstrual History 2021   LAST MENSTRUAL PERIOD 2021 - 2022   Menarche Age - - -   Period Cycle (Days) -  30 -   Period Duration (Days) - 2-3 -   Method of Contraception - Combined oral contraceptive -   Period Pattern - Regular -   Menstrual Flow - Moderate -   Menstrual Control - Maxi pad;Tampon -   Dysmenorrhea - Mild -   PMS Symptoms - Cramping -   Reviewed Today - Yes -     Mammogram current: n/a  - no fam hx  Last Colonoscopy: n/a - no fam hx      HISTORY:  rizatriptan (MAXALT-MLT) 10 MG ODT, Take 10 mg by mouth (Patient not taking: Reported on 2023)    No current facility-administered medications on file prior to visit.    No Known Allergies  There is no immunization history for the selected administration types on file for this patient.    OB History    Para Term  AB Living   0 0 0 0 0 0   SAB IAB Ectopic Multiple Live Births   0 0 0 0 0     Past Medical History:   Diagnosis Date     Migraines      Uncomplicated asthma     As a kid. Haven't required inhaler for years.     Past Surgical History:   Procedure Laterality Date     ENT SURGERY      [place] teeth     NO HISTORY OF SURGERY       Family History   Problem Relation Age of Onset     Diabetes Father         Type 2     Breast Cancer No family hx of      Colon Cancer No family hx of      Hyperlipidemia No family hx of      Social History     Socioeconomic History     Marital status:      Spouse name: None     Number of children: None     Years of education: None     Highest education level: None   Tobacco Use     Smoking status: Never     Smokeless tobacco: Never   Substance and Sexual Activity     Alcohol use: Yes     Comment: periodically drink wine for dinner.     Drug use: No     Sexual activity: Yes     Partners: Male     Birth control/protection: Pill       Review of Systems     Constitutional:  Negative for fever, chills, weight loss, weight gain, fatigue, decreased appetite, night sweats, recent stressors, height gain, height loss, post-operative complications, incisional pain, hallucinations, increased energy,  hyperactivity and confused.   HENT:  Negative for ear pain, hearing loss, tinnitus, nosebleeds, trouble swallowing, hoarse voice, mouth sores, sore throat, ear discharge, tooth pain, gum tenderness, taste disturbance, smell disturbance, hearing aid, bleeding gums, dry mouth, sinus pain, sinus congestion and neck mass.    Eyes:  Negative for double vision, pain, redness, eye pain, decreased vision, eye watering, eye bulging, eye dryness, flashing lights, spots, floaters, strabismus, tunnel vision, jaundice and eye irritation.   Respiratory:   Negative for cough, hemoptysis, sputum production, shortness of breath, wheezing, sleep disturbances due to breathing, snores loudly, respiratory pain, dyspnea on exertion, cough disturbing sleep and postural dyspnea.    Cardiovascular:  Negative for chest pain, dyspnea on exertion, palpitations, orthopnea, claudication, leg swelling, fingers/toes turn blue, hypertension, hypotension, syncope, history of heart murmur, chest pain on exertion, chest pain at rest, pacemaker, few scattered varicosities, leg pain, sleep disturbances due to breathing, tachycardia, light-headedness, exercise intolerance and edema.   Gastrointestinal:  Negative for heartburn, nausea, vomiting, abdominal pain, diarrhea, constipation, blood in stool, melena, rectal pain, bloating, hemorrhoids, bowel incontinence, jaundice, rectal bleeding, coffee ground emesis and change in stool.   Genitourinary:  Negative for bladder incontinence, dysuria, urgency, hematuria, flank pain, vaginal discharge, difficulty urinating, genital sores, dyspareunia, decreased libido, nocturia, voiding less frequently, arousal difficulty, abnormal vaginal bleeding, excessive menstruation, menstrual changes, hot flashes, vaginal dryness and postmenopausal bleeding.   Musculoskeletal:  Negative for myalgias, back pain, joint swelling, arthralgias, stiffness, muscle cramps, neck pain, bone pain, muscle weakness and fracture.   Skin:   "Negative for nail changes, itching, poor wound healing, rash, hair changes, skin changes, acne, warts, poor wound healing, scarring, flaky skin, Raynaud's phenomenon, sensitivity to sunlight and skin thickening.   Neurological:  Negative for dizziness, tingling, tremors, speech change, seizures, loss of consciousness, weakness, light-headedness, numbness, headaches, disturbances in coordination, extremity numbness, memory loss, difficulty walking and paralysis.   Endo/Heme:  Negative for anemia, swollen glands and bruises/bleeds easily.   Psychiatric/Behavioral:  Negative for depression, hallucinations, memory loss, decreased concentration, mood swings and panic attacks.    Breast:  Negative for breast discharge, breast mass, breast pain and nipple retraction.   Endocrine:  Negative for altered temperature regulation, polyphagia, polydipsia, unwanted hair growth and change in facial hair.      PHQ-9 SCORE 12/5/2018 1/17/2023   PHQ-9 Total Score 0 0     CHIQUITA-7 SCORE 8/31/2021 1/14/2023 1/17/2023   Total Score 1 (minimal anxiety) 0 (minimal anxiety) -   Total Score 0 0 0     EXAM:  Blood pressure 108/77, pulse 73, height 1.575 m (5' 2\"), weight 74.8 kg (165 lb), last menstrual period 12/17/2022, not currently breastfeeding. Body mass index is 30.18 kg/m .  General - pleasant female in no acute distress.  Skin - no suspicious lesions or rashes  EENT-  euthyroid with out palpable nodules  Neck - supple without lymphadenopathy.  Lungs - clear to auscultation bilaterally.  Heart - regular rate and rhythm without murmur.  Abdomen - soft, nontender, nondistended, no masses or organomegaly noted.  Musculoskeletal - no gross deformities.  Neurological - normal strength, sensation, and mental status.    Breast Exam:  Breast: Without visible skin changes. No dimpling or lesions seen.   Breasts supple, non-tender with palpation, no dominant mass, nodularity, or nipple discharge noted bilaterally. Axillary nodes negative.  "     Pelvic Exam: deferred      ASSESSMENT:  Encounter Diagnoses   Name Primary?     Visit for preventive health examination Yes     Encounter for surveillance of contraceptive pills      Migraine without aura and without status migrainosus, not intractable         PLAN:     Orders Placed This Encounter   Medications     levonorgestrel-ethinyl estradiol (AVIANE) 0.1-20 MG-MCG tablet     Sig: Take 1 tablet by mouth daily     Dispense:  84 tablet     Refill:  4     SUMAtriptan (IMITREX) 50 MG tablet     Sig: Take 1 tablet (50 mg) by mouth at onset of headache for migraine May repeat in 2 hours. Max 4 tablets/24 hours.     Dispense:  8 tablet     Refill:  0     Declines flu vaccine and COVID booster. Tdap is up to date  Declined lipid profile today  Pap smear next due 8/2024.   Migraines to be managed with Imitrex Rx, see above. Pt to follow-up in 3 months or sooner PRN if inadequate relief from this therapy.   Refill provided for COCs.   Additional teaching done at this visit regarding calcium (1200 mg per day), self breast exam, exercise, birth control, mental health and weight/diet.    Return to clinic in one year.  Follow-up as needed.    Marry Briceno, CIARAN, APRN, WHNP                Again, thank you for allowing me to participate in the care of your patient.      Sincerely,    Marry Briceno, VIDA CNP

## 2023-01-17 NOTE — LETTER
Date:January 18, 2023      Provider requested that no letter be sent. Do not send.       [facility]

## 2024-02-12 NOTE — PROGRESS NOTES
Progress Note    SUBJECTIVE:  Kayla Pineda is an 33 year old, , who requests an Annual Preventive Exam.     Concerns today include:   Contraception, refill on COCs: no immediate plans for pregnancy, happy with this contraceptive method  Migraine management, happy with Imitrex: has noted less migraines in past year, believes it is due to alcohol use and so has cut back. Denies presence of auras.     Last pap smear 2021 NIL --> next due 2024  - no hx of abnormal pap smears     Sexual hx: male partner, 1 in the last year  - Contraception: COCs  - Denies sexual concerns today. Does endorse past pain with tampon insertion, but is no longer an issue. Uses lubrication with partner.   - Declines STD testing    Lipids: never tested; no fam hx of hyperlipidemia        Exercise: daily walks with her dog  Diet: no specific diet     Mental Health: feeling well; no concerns    Social hx: works as an ,     Menstrual History:      2021     8:02 AM 2023     9:00 AM 2024     8:20 AM   Menstrual History   LAST MENSTRUAL PERIOD  2022   Period Cycle (Days) 28     Period Duration (Days) 2-3     Method of Contraception Combined oral contraceptive     Period Pattern Regular     Menstrual Flow Moderate     Menstrual Control Maxi pad;Tampon     Dysmenorrhea Mild     PMS Symptoms Cramping     Reviewed Today Yes       Mammogram current: not applicable    Last Colonoscopy: n/a    HISTORY:  No current outpatient medications on file prior to visit.  No current facility-administered medications on file prior to visit.    No Known Allergies  Immunization History   Administered Date(s) Administered    COVID-19 MONOVALENT 12+ (Pfizer) 2021, 2021     OB History    Para Term  AB Living   0 0 0 0 0 0   SAB IAB Ectopic Multiple Live Births   0 0 0 0 0     Past Medical History:   Diagnosis Date    Migraines     Uncomplicated asthma     As a kid. Haven't  "required inhaler for years.     Past Surgical History:   Procedure Laterality Date    ENT SURGERY  2009    [place] teeth     Family History   Problem Relation Age of Onset    Diabetes Father         Type 2    Breast Cancer No family hx of     Colon Cancer No family hx of     Hyperlipidemia No family hx of     Thyroid Disease No family hx of      Social History     Socioeconomic History    Marital status:      Spouse name: None    Number of children: None    Years of education: None    Highest education level: None   Tobacco Use    Smoking status: Never    Smokeless tobacco: Never   Substance and Sexual Activity    Alcohol use: Yes     Comment: periodically drink wine for dinner.    Drug use: No    Sexual activity: Yes     Partners: Male     Birth control/protection: Pill       ROS  ROS: 10 point ROS neg other than the symptoms noted above in the HPI.        12/5/2018     9:53 AM 1/17/2023     8:56 AM   PHQ-9 SCORE   PHQ-9 Total Score 0 0   PHQ-2 Score:         2/12/2024     9:33 AM 1/17/2023     8:56 AM   PHQ-2 ( 1999 Pfizer)   Q1: Little interest or pleasure in doing things 0 0   Q2: Feeling down, depressed or hopeless 0 0   PHQ-2 Score 0 0   Q1: Little interest or pleasure in doing things Not at all    Q2: Feeling down, depressed or hopeless Not at all    PHQ-2 Score 0        EXAM:  Blood pressure 111/76, pulse 79, height 1.575 m (5' 2\"), weight 75.8 kg (167 lb), last menstrual period 02/12/2024, not currently breastfeeding. Body mass index is 30.54 kg/m .  General - pleasant female in no acute distress.  Skin - no suspicious lesions or rashes  Neck - supple without lymphadenopathy.  Lungs - clear to auscultation bilaterally.  Heart - regular rate and rhythm without murmur.  Abdomen - soft, nontender, nondistended, no masses or organomegaly noted.  Musculoskeletal - no gross deformities.  Neurological - normal strength, sensation, and mental status.    Breast Exam:  Breast: Without visible skin changes. No " dimpling or lesions seen. Breasts supple, non-tender with palpation, no dominant mass, nodularity, or nipple discharge noted bilaterally. Axillary nodes negative.      Pelvic Exam:  EG/BUS: Normal genital architecture without lesions, erythema or abnormal secretions Bartholin's, Urethra, [place]'s normal   Urethral meatus: normal   Urethra: no masses, tenderness, or scarring   Vagina: moist, pink, rugae with blood tinged  secretions  Cervix: no lesions and pink, moist, closed without lesion    ASSESSMENT/ PLAN:    Encounter Diagnoses   Name Primary?    Encounter for surveillance of contraceptive pills     Visit for preventive health examination Yes    Screening for lipid disorders     Screening for cervical cancer     Migraine without aura and without status migrainosus, not intractable         PLAN:   1. Encounter for surveillance of contraceptive pills  Happy on current regimen, no concerns at this time.   - levonorgestrel-ethinyl estradiol (AVIANE) 0.1-20 MG-MCG tablet; Take 1 tablet by mouth daily  Dispense: 84 tablet; Refill: 4    2. Visit for preventive health examination  3. Screen for cervical cancer  4. Screen for lipid disorders  - Lipid Profile; Future  - Gynecologic Cytology (PAP Smear)  - Declines flu vaccine    5. Migraine without aura and without status migrainosus, not intractable  - SUMAtriptan (IMITREX) 50 MG tablet; Take 1 tablet (50 mg) by mouth at onset of headache for migraine May repeat in 2 hours. Max 4 tablets/24 hours.  Dispense: 8 tablet; Refill: 0    Additional teaching done at this visit regarding self breast awareness, healthy lifestyle, and birth control.    Return to clinic in one year.  Follow-up as needed.    I, Linda Eller, completed the PFSH and ROS. I then acted as a scribe for CAMPOS Nguyen, for the remainder of the visit.  Linda Eller BSN, RN, NP Student    I agree with the PFSH and ROS as completed by the CAMPOS Student, except for changes made by me.  The  remainder of the encounter was performed by me and scribed by the CAMPOS Student.  The scribed note accurately reflects my personal services and decisions made by me.  Marry Briceno, DNP, APRN, BJNP

## 2024-02-13 ENCOUNTER — LAB (OUTPATIENT)
Dept: LAB | Facility: CLINIC | Age: 34
End: 2024-02-13
Attending: NURSE PRACTITIONER
Payer: COMMERCIAL

## 2024-02-13 ENCOUNTER — OFFICE VISIT (OUTPATIENT)
Dept: OBGYN | Facility: CLINIC | Age: 34
End: 2024-02-13
Attending: NURSE PRACTITIONER
Payer: COMMERCIAL

## 2024-02-13 VITALS
DIASTOLIC BLOOD PRESSURE: 76 MMHG | HEIGHT: 62 IN | HEART RATE: 79 BPM | SYSTOLIC BLOOD PRESSURE: 111 MMHG | WEIGHT: 167 LBS | BODY MASS INDEX: 30.73 KG/M2

## 2024-02-13 DIAGNOSIS — Z00.00 VISIT FOR PREVENTIVE HEALTH EXAMINATION: ICD-10-CM

## 2024-02-13 DIAGNOSIS — Z13.220 SCREENING FOR LIPID DISORDERS: ICD-10-CM

## 2024-02-13 DIAGNOSIS — Z30.41 ENCOUNTER FOR SURVEILLANCE OF CONTRACEPTIVE PILLS: ICD-10-CM

## 2024-02-13 DIAGNOSIS — Z00.00 VISIT FOR PREVENTIVE HEALTH EXAMINATION: Primary | ICD-10-CM

## 2024-02-13 DIAGNOSIS — G43.009 MIGRAINE WITHOUT AURA AND WITHOUT STATUS MIGRAINOSUS, NOT INTRACTABLE: ICD-10-CM

## 2024-02-13 DIAGNOSIS — Z12.4 SCREENING FOR CERVICAL CANCER: ICD-10-CM

## 2024-02-13 LAB
CHOLEST SERPL-MCNC: 211 MG/DL
FASTING STATUS PATIENT QL REPORTED: NO
HDLC SERPL-MCNC: 54 MG/DL
LDLC SERPL CALC-MCNC: 113 MG/DL
NONHDLC SERPL-MCNC: 157 MG/DL
TRIGL SERPL-MCNC: 219 MG/DL

## 2024-02-13 PROCEDURE — 36415 COLL VENOUS BLD VENIPUNCTURE: CPT

## 2024-02-13 PROCEDURE — 80061 LIPID PANEL: CPT

## 2024-02-13 PROCEDURE — 99213 OFFICE O/P EST LOW 20 MIN: CPT | Performed by: NURSE PRACTITIONER

## 2024-02-13 PROCEDURE — 99395 PREV VISIT EST AGE 18-39: CPT | Performed by: NURSE PRACTITIONER

## 2024-02-13 PROCEDURE — 87624 HPV HI-RISK TYP POOLED RSLT: CPT | Performed by: NURSE PRACTITIONER

## 2024-02-13 PROCEDURE — G0145 SCR C/V CYTO,THINLAYER,RESCR: HCPCS | Performed by: NURSE PRACTITIONER

## 2024-02-13 RX ORDER — LEVONORGESTREL/ETHIN.ESTRADIOL 0.1-0.02MG
1 TABLET ORAL DAILY
Qty: 84 TABLET | Refills: 4 | Status: SHIPPED | OUTPATIENT
Start: 2024-02-13

## 2024-02-13 RX ORDER — SUMATRIPTAN 50 MG/1
50 TABLET, FILM COATED ORAL
Qty: 8 TABLET | Refills: 0 | Status: SHIPPED | OUTPATIENT
Start: 2024-02-13

## 2024-02-13 NOTE — LETTER
2024       RE: Kayla Pineda  [address]  [place] [address]     Dear Colleague,    Thank you for referring your patient, Kayla Pineda, to the [facility] [place] at [facility]. Please see a copy of my visit note below.    Progress Note    SUBJECTIVE:  Kayla Pineda is an 33 year old, , who requests an Annual Preventive Exam.     Concerns today include:   Contraception, refill on COCs: no immediate plans for pregnancy, happy with this contraceptive method  Migraine management, happy with Imitrex: has noted less migraines in past year, believes it is due to alcohol use and so has cut back. Denies presence of auras.     Last pap smear 2021 NIL --> next due 2024  - no hx of abnormal pap smears     Sexual hx: male partner, 1 in the last year  - Contraception: COCs  - Denies sexual concerns today. Does endorse past pain with tampon insertion, but is no longer an issue. Uses lubrication with partner.   - Declines STD testing    Lipids: never tested; no fam hx of hyperlipidemia        Exercise: daily walks with her dog  Diet: no specific diet     Mental Health: feeling well; no concerns    Social hx: works as an ,     Menstrual History:      2021     8:02 AM 2023     9:00 AM 2024     8:20 AM   Menstrual History   LAST MENSTRUAL PERIOD  2022   Period Cycle (Days) 28     Period Duration (Days) 2-3     Method of Contraception Combined oral contraceptive     Period Pattern Regular     Menstrual Flow Moderate     Menstrual Control Maxi pad;Tampon     Dysmenorrhea Mild     PMS Symptoms Cramping     Reviewed Today Yes       Mammogram current: not applicable    Last Colonoscopy: n/a    HISTORY:  No current outpatient medications on file prior to visit.  No current facility-administered medications on file prior to visit.    No Known Allergies  Immunization History  "  Administered Date(s) Administered    COVID-19 MONOVALENT 12+ (Pfizer) 2021, 2021     OB History    Para Term  AB Living   0 0 0 0 0 0   SAB IAB Ectopic Multiple Live Births   0 0 0 0 0     Past Medical History:   Diagnosis Date    Migraines     Uncomplicated asthma     As a kid. Haven't required inhaler for years.     Past Surgical History:   Procedure Laterality Date    ENT SURGERY  2009    [place] teeth     Family History   Problem Relation Age of Onset    Diabetes Father         Type 2    Breast Cancer No family hx of     Colon Cancer No family hx of     Hyperlipidemia No family hx of     Thyroid Disease No family hx of      Social History     Socioeconomic History    Marital status:      Spouse name: None    Number of children: None    Years of education: None    Highest education level: None   Tobacco Use    Smoking status: Never    Smokeless tobacco: Never   Substance and Sexual Activity    Alcohol use: Yes     Comment: periodically drink wine for dinner.    Drug use: No    Sexual activity: Yes     Partners: Male     Birth control/protection: Pill       ROS  ROS: 10 point ROS neg other than the symptoms noted above in the HPI.        2018     9:53 AM 2023     8:56 AM   PHQ-9 SCORE   PHQ-9 Total Score 0 0   PHQ-2 Score:         2024     9:33 AM 2023     8:56 AM   PHQ-2 (  Pfizer)   Q1: Little interest or pleasure in doing things 0 0   Q2: Feeling down, depressed or hopeless 0 0   PHQ-2 Score 0 0   Q1: Little interest or pleasure in doing things Not at all    Q2: Feeling down, depressed or hopeless Not at all    PHQ-2 Score 0        EXAM:  Blood pressure 111/76, pulse 79, height 1.575 m (5' 2\"), weight 75.8 kg (167 lb), last menstrual period 2024, not currently breastfeeding. Body mass index is 30.54 kg/m .  General - pleasant female in no acute distress.  Skin - no suspicious lesions or rashes  Neck - supple without lymphadenopathy.  Lungs " - clear to auscultation bilaterally.  Heart - regular rate and rhythm without murmur.  Abdomen - soft, nontender, nondistended, no masses or organomegaly noted.  Musculoskeletal - no gross deformities.  Neurological - normal strength, sensation, and mental status.    Breast Exam:  Breast: Without visible skin changes. No dimpling or lesions seen. Breasts supple, non-tender with palpation, no dominant mass, nodularity, or nipple discharge noted bilaterally. Axillary nodes negative.      Pelvic Exam:  EG/BUS: Normal genital architecture without lesions, erythema or abnormal secretions Bartholin's, Urethra, [place]'s normal   Urethral meatus: normal   Urethra: no masses, tenderness, or scarring   Vagina: moist, pink, rugae with blood tinged  secretions  Cervix: no lesions and pink, moist, closed without lesion    ASSESSMENT/ PLAN:    Encounter Diagnoses   Name Primary?    Encounter for surveillance of contraceptive pills     Visit for preventive health examination Yes    Screening for lipid disorders     Screening for cervical cancer     Migraine without aura and without status migrainosus, not intractable         PLAN:   1. Encounter for surveillance of contraceptive pills  Happy on current regimen, no concerns at this time.   - levonorgestrel-ethinyl estradiol (AVIANE) 0.1-20 MG-MCG tablet; Take 1 tablet by mouth daily  Dispense: 84 tablet; Refill: 4    2. Visit for preventive health examination  3. Screen for cervical cancer  4. Screen for lipid disorders  - Lipid Profile; Future  - Gynecologic Cytology (PAP Smear)  - Declines flu vaccine    5. Migraine without aura and without status migrainosus, not intractable  - SUMAtriptan (IMITREX) 50 MG tablet; Take 1 tablet (50 mg) by mouth at onset of headache for migraine May repeat in 2 hours. Max 4 tablets/24 hours.  Dispense: 8 tablet; Refill: 0    Additional teaching done at this visit regarding self breast awareness, healthy lifestyle, and birth control.    Return to  clinic in one year.  Follow-up as needed.    I, Linda Eller, completed the PFSH and ROS. I then acted as a scribe for CAMPOS Nguyen, for the remainder of the visit.  Linda Eller BSN, RN, NP Student    I agree with the PFSH and ROS as completed by the CAMPOS Student, except for changes made by me.  The remainder of the encounter was performed by me and scribed by the WHNP Student.  The scribed note accurately reflects my personal services and decisions made by me.  Marry Briceno, CIARAN, APRN, CAMPOS

## 2024-02-15 LAB
BKR LAB AP GYN ADEQUACY: NORMAL
BKR LAB AP GYN INTERPRETATION: NORMAL
BKR LAB AP HPV REFLEX: NORMAL
BKR LAB AP LMP: NORMAL
BKR LAB AP PREVIOUS ABNORMAL: NORMAL
PATH REPORT.COMMENTS IMP SPEC: NORMAL
PATH REPORT.COMMENTS IMP SPEC: NORMAL
PATH REPORT.RELEVANT HX SPEC: NORMAL

## 2024-02-19 LAB
HUMAN PAPILLOMA VIRUS 16 DNA: NEGATIVE
HUMAN PAPILLOMA VIRUS 18 DNA: NEGATIVE
HUMAN PAPILLOMA VIRUS FINAL DIAGNOSIS: NORMAL
HUMAN PAPILLOMA VIRUS OTHER HR: NEGATIVE

## 2024-03-23 DIAGNOSIS — G43.009 MIGRAINE WITHOUT AURA AND WITHOUT STATUS MIGRAINOSUS, NOT INTRACTABLE: ICD-10-CM

## 2024-04-14 DIAGNOSIS — Z30.41 ENCOUNTER FOR SURVEILLANCE OF CONTRACEPTIVE PILLS: ICD-10-CM

## 2024-04-18 ENCOUNTER — MYC REFILL (OUTPATIENT)
Dept: OBGYN | Facility: CLINIC | Age: 34
End: 2024-04-18
Payer: COMMERCIAL

## 2024-04-18 ENCOUNTER — MYC MEDICAL ADVICE (OUTPATIENT)
Dept: OBGYN | Facility: CLINIC | Age: 34
End: 2024-04-18
Payer: COMMERCIAL

## 2024-04-18 DIAGNOSIS — Z30.41 ENCOUNTER FOR SURVEILLANCE OF CONTRACEPTIVE PILLS: ICD-10-CM

## 2024-04-18 RX ORDER — LEVONORGESTREL/ETHIN.ESTRADIOL 0.1-0.02MG
1 TABLET ORAL DAILY
Qty: 84 TABLET | Refills: 4 | Status: CANCELLED | OUTPATIENT
Start: 2024-04-18

## 2024-06-21 RX ORDER — SUMATRIPTAN 50 MG/1
TABLET, FILM COATED ORAL
Qty: 8 TABLET | Refills: 0 | OUTPATIENT
Start: 2024-06-21

## 2024-06-24 DIAGNOSIS — G43.009 MIGRAINE WITHOUT AURA AND WITHOUT STATUS MIGRAINOSUS, NOT INTRACTABLE: ICD-10-CM

## 2024-07-13 RX ORDER — TIMOLOL MALEATE 5 MG/ML
1 SOLUTION/ DROPS OPHTHALMIC DAILY
Qty: 84 TABLET | Refills: 3 | OUTPATIENT
Start: 2024-07-13

## 2024-09-22 RX ORDER — SUMATRIPTAN 50 MG/1
TABLET, FILM COATED ORAL
Qty: 8 TABLET | Refills: 0 | OUTPATIENT
Start: 2024-09-22

## 2024-11-04 ASSESSMENT — ANXIETY QUESTIONNAIRES: GAD7 TOTAL SCORE: 0

## 2024-11-26 ASSESSMENT — ANXIETY QUESTIONNAIRES: GAD7 TOTAL SCORE: 0

## 2025-02-19 NOTE — PROGRESS NOTES
Pull annual exam from 2/2024  Last pap smear: 2/2024 NIL, HPV negative  Recent Labs   Lab Test 02/13/24  0845   CHOL 211*   HDL 54   *   TRIG 219*

## 2025-02-20 ENCOUNTER — OFFICE VISIT (OUTPATIENT)
Dept: OBGYN | Facility: CLINIC | Age: 35
End: 2025-02-20
Attending: NURSE PRACTITIONER
Payer: COMMERCIAL

## 2025-02-20 VITALS
BODY MASS INDEX: 29.89 KG/M2 | HEART RATE: 70 BPM | DIASTOLIC BLOOD PRESSURE: 78 MMHG | WEIGHT: 163.4 LBS | SYSTOLIC BLOOD PRESSURE: 119 MMHG

## 2025-02-20 DIAGNOSIS — Z31.69 ENCOUNTER FOR PRECONCEPTION CONSULTATION: ICD-10-CM

## 2025-02-20 DIAGNOSIS — G43.009 MIGRAINE WITHOUT AURA AND WITHOUT STATUS MIGRAINOSUS, NOT INTRACTABLE: ICD-10-CM

## 2025-02-20 DIAGNOSIS — Z00.00 VISIT FOR PREVENTIVE HEALTH EXAMINATION: Primary | ICD-10-CM

## 2025-02-20 DIAGNOSIS — Z30.41 ENCOUNTER FOR SURVEILLANCE OF CONTRACEPTIVE PILLS: ICD-10-CM

## 2025-02-20 PROCEDURE — 99213 OFFICE O/P EST LOW 20 MIN: CPT | Performed by: NURSE PRACTITIONER

## 2025-02-20 RX ORDER — LEVONORGESTREL/ETHIN.ESTRADIOL 0.1-0.02MG
1 TABLET ORAL DAILY
Qty: 84 TABLET | Refills: 4 | Status: SHIPPED | OUTPATIENT
Start: 2025-02-20

## 2025-02-20 RX ORDER — SUMATRIPTAN 50 MG/1
50 TABLET, FILM COATED ORAL
Qty: 8 TABLET | Refills: 0 | Status: SHIPPED | OUTPATIENT
Start: 2025-02-20

## 2025-02-20 NOTE — PATIENT INSTRUCTIONS
Thank you for trusting us with your care!   Please be aware, if you are on Mychart, you may see your results prior to your providers review. If labs are abnormal, we will call or message you on [place] with a follow up plan.    If you need to contact us for questions about:  Symptoms, Scheduling & Medical Questions; Non-urgent (2-3 day response) [employer] (formerly [employer]) message, Urgent (needing response today) [phone] (if after 3:30pm next day response)   Prescriptions: Please call your Pharmacy   Billing: [place] [phone] or [facility]:[phone]

## 2025-02-20 NOTE — PROGRESS NOTES
Progress Note    SUBJECTIVE:  Kayla Pineda is an 34 year old, , who requests an Annual Preventive Exam.     Concerns today include:   - Currently on COCs, is planning to go off pills in April and try to conceive with partner. Pt denies smoking/tobacco use. Reports understanding of alcohol cessation when attempting to conceive.   - Needing refill of imitrex today, currently gets a migraine every 3 weeks. Believes it is alcohol related and increased screen time. Denies presence of auras. Finds relief with imitrex prescription.      Last pap smear 2024, NIL - due in ; no hx of abnormal pap smears     Sexual hx: 1 male partner  - Denies sexual concerns today.   - Declines STD testing     Lipids:   Recent Labs   Lab Test 24  0845   CHOL 211*   HDL 54   *   TRIG 219*     Exercise: daily walks with her dog  Diet: no specific diet  Mental Health: feeling well; no concerns  Social hx: works as an , ; traveling to [place] in April!    Menstrual History:      2023     9:00 AM 2024     8:20 AM 2025     9:20 AM   Menstrual History   LAST MENSTRUAL PERIOD 2022     Mammogram current: not applicable    Last Colonoscopy: n/a    HISTORY:  Prescription Medications as of 2025         Rx Number Disp Refills Start End Last Dispensed Date Next Fill Date Owning Pharmacy    levonorgestrel-ethinyl estradiol (AVIANE) 0.1-20 MG-MCG tablet  84 tablet 4 2024 --   [id number] [place] [address]    Sig: Take 1 tablet by mouth daily    Class: E-Prescribe    Route: Oral    SUMAtriptan (IMITREX) 50 MG tablet  8 tablet 0 2025 --   [id number] [place] [address]    Sig: Take 1 tablet (50 mg) by mouth at onset of headache for migraine. May repeat in 2 hours. Max 4 tablets/24 hours.    Class: E-Prescribe    Route: Oral          No Known Allergies  Immunization History   Administered  Date(s) Administered    COVID-19 MONOVALENT 12+ (Pfizer) 2021, 2021    TDAP (Adacel,Boostrix) 2021     OB History    Para Term  AB Living   0 0 0 0 0 0   SAB IAB Ectopic Multiple Live Births   0 0 0 0 0     Past Medical History:   Diagnosis Date    Migraines     Uncomplicated asthma     As a kid. Haven't required inhaler for years.     Past Surgical History:   Procedure Laterality Date    ENT SURGERY  2009    [place] teeth     Family History   Problem Relation Age of Onset    Diabetes Father         Type 2    Breast Cancer No family hx of     Colon Cancer No family hx of     Hyperlipidemia No family hx of     Thyroid Disease No family hx of      Social History     Socioeconomic History    Marital status:    Tobacco Use    Smoking status: Never    Smokeless tobacco: Never   Substance and Sexual Activity    Alcohol use: Yes     Comment: periodically drink wine for dinner.    Drug use: No    Sexual activity: Yes     Partners: Male     Birth control/protection: Pill      ROS: 10 point ROS neg other than the symptoms noted above in the HPI.       2018     9:53 AM 2023     8:56 AM   PHQ-9 SCORE   PHQ-9 Total Score 0 0         2021     8:02 AM 2023     2:38 PM 2023     8:56 AM   CHIQUITA-7 SCORE   Total Score  0 (minimal anxiety)    Total Score 0 0 0     PHQ-2 Score:       2025     8:57 PM 2024     9:33 AM   PHQ-2 (  Pfizer)   Q1: Little interest or pleasure in doing things 0 0   Q2: Feeling down, depressed or hopeless 0 0   PHQ-2 Score 0  0   Q1: Little interest or pleasure in doing things Not at all Not at all   Q2: Feeling down, depressed or hopeless Not at all Not at all   PHQ-2 Score 0 0       Patient-reported     EXAM:  Blood pressure 119/78, pulse 70, weight 74.1 kg (163 lb 6.4 oz), last menstrual period 2025, not currently breastfeeding. Body mass index is 29.89 kg/m .  General - pleasant female in no acute distress.  Skin - no  suspicious lesions or rashes  Neck - supple without lymphadenopathy.  Lungs - clear to auscultation bilaterally.  Heart - regular rate and rhythm without murmur.  Abdomen - soft, nontender, nondistended, no masses or organomegaly noted.  Musculoskeletal - no gross deformities.  Neurological - normal strength, sensation, and mental status.    Breast Exam:  Breast: Without visible skin changes. No dimpling or lesions seen.   Breasts supple, non-tender with palpation, no dominant mass, nodularity, or nipple discharge noted bilaterally. Axillary nodes negative.      Pelvic Exam: deferred    ASSESSMENT/PLAN:  1. Visit for preventive health examination (Primary)  - Pap smear up to date, due in 2027.   - Discussed option to screen lipids today due to mild elevation last year. Discussed results would not necessarily change treatment plan as recommendations now are focusing on diet/exercise and given desire to conceive in the next year. Pt agreeable to this plan.   - Pt declines flu vaccine    2. Encounter for preconception consultation  - Discussed preconception education including initiating prenatal vitamin as early as today, excluding alcohol from diet when attempting to conceive, and tracking cycles.   - Discussed if cycles do not regulate 3-4 months following discontinuing birth control to follow up.   - Discussed if attempting to conceive for 1 year without success to follow up for further evaluation.     3. Migraine without aura and without status migrainosus, not intractable  - SUMAtriptan (IMITREX) 50 MG tablet; Take 1 tablet (50 mg) by mouth at onset of headache for migraine. May repeat in 2 hours. Max 4 tablets/24 hours.  Dispense: 8 tablet; Refill: 0     Additional teaching done at this visit regarding self breast awareness, birth control, and preconception.    Return to clinic in one year.  Follow-up as needed.      I, Tatiana Mcintyre, completed the PFSH and ROS. I then acted as a scribe for Marry Briceno  CAMPOS, for the remainder of the visit.  Tatiana Mcintyre RN, BSN, WHNP, DNP Student     I agree with the PFSH and ROS as completed by the WHNP Student, except for changes made by me.  The remainder of the encounter was performed by me and scribed by the WHNP Student.  The scribed note accurately reflects my personal services and decisions made by me.  Marry Briceno, CIARAN, APRN, WHNP

## 2025-02-20 NOTE — LETTER
2025       RE: Kayla Pineda  [address] [place] [address]     Dear Colleague,    Thank you for referring your patient, Kayla Pineda, to the [facility] [place] at [facility]. Please see a copy of my visit note below.    Progress Note    SUBJECTIVE:  Kayla Pineda is an 34 year old, , who requests an Annual Preventive Exam.     Concerns today include:   - Currently on COCs, is planning to go off pills in April and try to conceive with partner. Pt denies smoking/tobacco use. Reports understanding of alcohol cessation when attempting to conceive.   - Needing refill of imitrex today, currently gets a migraine every 3 weeks. Believes it is alcohol related and increased screen time. Denies presence of auras. Finds relief with imitrex prescription.      Last pap smear 2024, NIL - due in ; no hx of abnormal pap smears     Sexual hx: 1 male partner  - Denies sexual concerns today.   - Declines STD testing     Lipids:   Recent Labs   Lab Test 24  0845   CHOL 211*   HDL 54   *   TRIG 219*     Exercise: daily walks with her dog  Diet: no specific diet  Mental Health: feeling well; no concerns  Social hx: works as an , ; traveling to [place] in April!    Menstrual History:      2023     9:00 AM 2024     8:20 AM 2025     9:20 AM   Menstrual History   LAST MENSTRUAL PERIOD 2022     Mammogram current: not applicable    Last Colonoscopy: n/a    HISTORY:  Prescription Medications as of 2025         Rx Number Disp Refills Start End Last Dispensed Date Next Fill Date Owning Pharmacy    levonorgestrel-ethinyl estradiol (AVIANE) 0.1-20 MG-MCG tablet  84 tablet 4 2024 --   [facility] [place], [place] [facility] [address] [facility]    Sig: Take 1 tablet by mouth daily    Class: E-Prescribe    Route: Oral    SUMAtriptan (IMITREX) 50 MG  tablet  8 tablet 0 2025 --   [facility] [place], [place] - [address]    Sig: Take 1 tablet (50 mg) by mouth at onset of headache for migraine. May repeat in 2 hours. Max 4 tablets/24 hours.    Class: E-Prescribe    Route: Oral          No Known Allergies  Immunization History   Administered Date(s) Administered     COVID-19 MONOVALENT 12+ (Pfizer) 2021, 2021     TDAP (Adacel,Boostrix) 2021     OB History    Para Term  AB Living   0 0 0 0 0 0   SAB IAB Ectopic Multiple Live Births   0 0 0 0 0     Past Medical History:   Diagnosis Date     Migraines      Uncomplicated asthma     As a kid. Haven't required inhaler for years.     Past Surgical History:   Procedure Laterality Date     ENT SURGERY  2009    [place] teeth     Family History   Problem Relation Age of Onset     Diabetes Father         Type 2     Breast Cancer No family hx of      Colon Cancer No family hx of      Hyperlipidemia No family hx of      Thyroid Disease No family hx of      Social History     Socioeconomic History     Marital status:    Tobacco Use     Smoking status: Never     Smokeless tobacco: Never   Substance and Sexual Activity     Alcohol use: Yes     Comment: periodically drink wine for dinner.     Drug use: No     Sexual activity: Yes     Partners: Male     Birth control/protection: Pill      ROS: 10 point ROS neg other than the symptoms noted above in the HPI.       2018     9:53 AM 2023     8:56 AM   PHQ-9 SCORE   PHQ-9 Total Score 0 0         2021     8:02 AM 2023     2:38 PM 2023     8:56 AM   CHIQUITA-7 SCORE   Total Score  0 (minimal anxiety)    Total Score 0 0 0     PHQ-2 Score:       2025     8:57 PM 2024     9:33 AM   PHQ-2 (  Pfizer)   Q1: Little interest or pleasure in doing things 0 0   Q2: Feeling down, depressed or hopeless 0 0   PHQ-2 Score 0  0   Q1: Little interest or pleasure in doing things Not at all Not at all    Q2: Feeling down, depressed or hopeless Not at all Not at all   PHQ-2 Score 0 0       Patient-reported     EXAM:  Blood pressure 119/78, pulse 70, weight 74.1 kg (163 lb 6.4 oz), last menstrual period 01/30/2025, not currently breastfeeding. Body mass index is 29.89 kg/m .  General - pleasant female in no acute distress.  Skin - no suspicious lesions or rashes  Neck - supple without lymphadenopathy.  Lungs - clear to auscultation bilaterally.  Heart - regular rate and rhythm without murmur.  Abdomen - soft, nontender, nondistended, no masses or organomegaly noted.  Musculoskeletal - no gross deformities.  Neurological - normal strength, sensation, and mental status.    Breast Exam:  Breast: Without visible skin changes. No dimpling or lesions seen.   Breasts supple, non-tender with palpation, no dominant mass, nodularity, or nipple discharge noted bilaterally. Axillary nodes negative.      Pelvic Exam: deferred    ASSESSMENT/PLAN:  1. Visit for preventive health examination (Primary)  - Pap smear up to date, due in 2027.   - Discussed option to screen lipids today due to mild elevation last year. Discussed results would not necessarily change treatment plan as recommendations now are focusing on diet/exercise and given desire to conceive in the next year. Pt agreeable to this plan.   - Pt declines flu vaccine    2. Encounter for preconception consultation  - Discussed preconception education including initiating prenatal vitamin as early as today, excluding alcohol from diet when attempting to conceive, and tracking cycles.   - Discussed if cycles do not regulate 3-4 months following discontinuing birth control to follow up.   - Discussed if attempting to conceive for 1 year without success to follow up for further evaluation.     3. Migraine without aura and without status migrainosus, not intractable  - SUMAtriptan (IMITREX) 50 MG tablet; Take 1 tablet (50 mg) by mouth at onset of headache for migraine. May  repeat in 2 hours. Max 4 tablets/24 hours.  Dispense: 8 tablet; Refill: 0     Additional teaching done at this visit regarding self breast awareness, birth control, and preconception.    Return to clinic in one year.  Follow-up as needed.      I, Tatiana Mcintyre, completed the PFSH and ROS. I then acted as a scribe for CAMPOS Nguyen, for the remainder of the visit.  Tatiana Mcintyre, RN, BSN, CAMPOS, CIARAN Student     I agree with the PFSH and ROS as completed by the CAMPOS Student, except for changes made by me.  The remainder of the encounter was performed by me and scribed by the CAMPOS Student.  The scribed note accurately reflects my personal services and decisions made by me.  Marry Briceno DNP, VIDA, CAMPOS            Again, thank you for allowing me to participate in the care of your patient.      Sincerely,    VIDA Borden CNP